# Patient Record
Sex: FEMALE | Race: WHITE | Employment: OTHER | URBAN - METROPOLITAN AREA
[De-identification: names, ages, dates, MRNs, and addresses within clinical notes are randomized per-mention and may not be internally consistent; named-entity substitution may affect disease eponyms.]

---

## 2024-09-03 ENCOUNTER — OFFICE VISIT (OUTPATIENT)
Dept: FAMILY MEDICINE CLINIC | Facility: CLINIC | Age: 89
End: 2024-09-03

## 2024-09-03 ENCOUNTER — HOSPITAL ENCOUNTER (OUTPATIENT)
Dept: GENERAL RADIOLOGY | Age: 89
Discharge: HOME | End: 2024-09-03
Attending: NURSE PRACTITIONER
Payer: MEDICARE

## 2024-09-03 VITALS
HEART RATE: 76 BPM | WEIGHT: 129.13 LBS | SYSTOLIC BLOOD PRESSURE: 117 MMHG | BODY MASS INDEX: 23.76 KG/M2 | HEIGHT: 62 IN | DIASTOLIC BLOOD PRESSURE: 73 MMHG

## 2024-09-03 DIAGNOSIS — M25.562 CHRONIC PAIN OF LEFT KNEE: ICD-10-CM

## 2024-09-03 DIAGNOSIS — G89.29 CHRONIC PAIN OF LEFT KNEE: ICD-10-CM

## 2024-09-03 DIAGNOSIS — Z86.79 HISTORY OF HYPERTENSION: ICD-10-CM

## 2024-09-03 DIAGNOSIS — Z76.89 ENCOUNTER TO ESTABLISH CARE: Primary | ICD-10-CM

## 2024-09-03 DIAGNOSIS — F03.90 DEMENTIA, UNSPECIFIED DEMENTIA SEVERITY, UNSPECIFIED DEMENTIA TYPE, UNSPECIFIED WHETHER BEHAVIORAL, PSYCHOTIC, OR MOOD DISTURBANCE OR ANXIETY (HCC): ICD-10-CM

## 2024-09-03 DIAGNOSIS — Z86.73 HISTORY OF CVA (CEREBROVASCULAR ACCIDENT): ICD-10-CM

## 2024-09-03 PROCEDURE — 73564 X-RAY EXAM KNEE 4 OR MORE: CPT | Performed by: NURSE PRACTITIONER

## 2024-09-03 PROCEDURE — 1159F MED LIST DOCD IN RCRD: CPT | Performed by: NURSE PRACTITIONER

## 2024-09-03 PROCEDURE — 3078F DIAST BP <80 MM HG: CPT | Performed by: NURSE PRACTITIONER

## 2024-09-03 PROCEDURE — 99204 OFFICE O/P NEW MOD 45 MIN: CPT | Performed by: NURSE PRACTITIONER

## 2024-09-03 PROCEDURE — 3008F BODY MASS INDEX DOCD: CPT | Performed by: NURSE PRACTITIONER

## 2024-09-03 PROCEDURE — 3074F SYST BP LT 130 MM HG: CPT | Performed by: NURSE PRACTITIONER

## 2024-09-03 PROCEDURE — 1160F RVW MEDS BY RX/DR IN RCRD: CPT | Performed by: NURSE PRACTITIONER

## 2024-09-03 RX ORDER — MULTIVITAMIN WITH IRON
50 TABLET ORAL DAILY
COMMUNITY

## 2024-09-04 ENCOUNTER — TELEPHONE (OUTPATIENT)
Dept: FAMILY MEDICINE CLINIC | Facility: CLINIC | Age: 89
End: 2024-09-04

## 2024-09-04 DIAGNOSIS — G89.29 CHRONIC PAIN OF LEFT KNEE: ICD-10-CM

## 2024-09-04 DIAGNOSIS — M25.562 CHRONIC PAIN OF LEFT KNEE: ICD-10-CM

## 2024-09-04 DIAGNOSIS — M17.12 ARTHRITIS OF LEFT KNEE: Primary | ICD-10-CM

## 2024-09-04 NOTE — TELEPHONE ENCOUNTER
Advised daughter Arely of Garima Ireland's note and number provided to orthopedic. Daughter verbalized understanding.           Garima Ireland, APRN  9/4/2024 10:36 AM CDT       Please call daughter to inform that x-ray shows arthritis of the knee with bone-on-bone.  A referral for orthopedics has been placed for assessment and treatment options.  Daughter requested a phone call, not MyChart message.  Thank you.

## 2024-09-04 NOTE — TELEPHONE ENCOUNTER
Attempted to call patient's daughter, Arely, regarding her Mom's x-ray results. Left message to call back.

## 2024-10-07 ENCOUNTER — LAB ENCOUNTER (OUTPATIENT)
Dept: LAB | Age: 89
End: 2024-10-07
Attending: FAMILY MEDICINE
Payer: MEDICARE

## 2024-10-07 DIAGNOSIS — F03.90 DEMENTIA, UNSPECIFIED DEMENTIA SEVERITY, UNSPECIFIED DEMENTIA TYPE, UNSPECIFIED WHETHER BEHAVIORAL, PSYCHOTIC, OR MOOD DISTURBANCE OR ANXIETY (HCC): ICD-10-CM

## 2024-10-07 DIAGNOSIS — E55.9 VITAMIN D DEFICIENCY: ICD-10-CM

## 2024-10-07 DIAGNOSIS — Z00.00 ENCOUNTER FOR ANNUAL HEALTH EXAMINATION: ICD-10-CM

## 2024-10-07 PROBLEM — Z86.73 HISTORY OF CVA (CEREBROVASCULAR ACCIDENT): Status: ACTIVE | Noted: 2024-10-07

## 2024-10-07 PROBLEM — Z86.79 HISTORY OF HYPERTENSION: Status: ACTIVE | Noted: 2024-10-07

## 2024-10-07 LAB — VIT D+METAB SERPL-MCNC: 46.8 NG/ML (ref 30–100)

## 2024-10-07 PROCEDURE — 85025 COMPLETE CBC W/AUTO DIFF WBC: CPT | Performed by: FAMILY MEDICINE

## 2024-10-07 PROCEDURE — 82607 VITAMIN B-12: CPT | Performed by: FAMILY MEDICINE

## 2024-10-07 PROCEDURE — 84443 ASSAY THYROID STIM HORMONE: CPT | Performed by: FAMILY MEDICINE

## 2024-10-07 PROCEDURE — 80053 COMPREHEN METABOLIC PANEL: CPT | Performed by: FAMILY MEDICINE

## 2024-10-07 PROCEDURE — 82306 VITAMIN D 25 HYDROXY: CPT

## 2024-10-07 PROCEDURE — 36415 COLL VENOUS BLD VENIPUNCTURE: CPT | Performed by: FAMILY MEDICINE

## 2024-10-07 PROCEDURE — 80061 LIPID PANEL: CPT | Performed by: FAMILY MEDICINE

## 2024-10-17 ENCOUNTER — TELEPHONE (OUTPATIENT)
Dept: FAMILY MEDICINE CLINIC | Facility: CLINIC | Age: 89
End: 2024-10-17

## 2024-10-17 NOTE — TELEPHONE ENCOUNTER
Per Arely, patient says that her Home health is not [facility], they would like to re-certify patient for her Home health care as soon as possible.  Please fax the most recent  office/ progress notes also  [phone].

## 2024-10-28 ENCOUNTER — LAB ENCOUNTER (OUTPATIENT)
Dept: LAB | Age: 89
End: 2024-10-28
Attending: Other
Payer: MEDICARE

## 2024-10-28 DIAGNOSIS — F02.C11 SEVERE LATE ONSET ALZHEIMER'S DEMENTIA WITH AGITATION (HCC): ICD-10-CM

## 2024-10-28 DIAGNOSIS — G30.1 SEVERE LATE ONSET ALZHEIMER'S DEMENTIA WITH AGITATION (HCC): ICD-10-CM

## 2024-10-28 PROBLEM — N18.30 CKD (CHRONIC KIDNEY DISEASE) STAGE 3, GFR 30-59 ML/MIN (HCC): Chronic | Status: ACTIVE | Noted: 2024-10-28

## 2024-10-28 PROCEDURE — 84443 ASSAY THYROID STIM HORMONE: CPT | Performed by: OTHER

## 2024-10-28 PROCEDURE — 83520 IMMUNOASSAY QUANT NOS NONAB: CPT

## 2024-10-28 PROCEDURE — 86780 TREPONEMA PALLIDUM: CPT | Performed by: OTHER

## 2024-10-28 PROCEDURE — 82607 VITAMIN B-12: CPT | Performed by: OTHER

## 2024-10-28 PROCEDURE — 84439 ASSAY OF FREE THYROXINE: CPT | Performed by: OTHER

## 2024-10-28 PROCEDURE — 82746 ASSAY OF FOLIC ACID SERUM: CPT | Performed by: OTHER

## 2024-10-28 PROCEDURE — 36415 COLL VENOUS BLD VENIPUNCTURE: CPT | Performed by: OTHER

## 2024-10-31 LAB
BETA-AMYLOID 40: 164.42 PG/ML
BETA-AMYLOID 40: 164.42 PG/ML
BETA-AMYLOID 42/40 RATIO: 0.11
BETA-AMYLOID 42/40 RATIO: 0.11
BETA-AMYLOID 42: 17.85 PG/ML
BETA-AMYLOID 42: 17.85 PG/ML

## 2024-11-04 ENCOUNTER — TELEPHONE (OUTPATIENT)
Dept: ORTHOPEDICS CLINIC | Facility: CLINIC | Age: 89
End: 2024-11-04

## 2024-11-04 ENCOUNTER — OFFICE VISIT (OUTPATIENT)
Dept: ORTHOPEDICS CLINIC | Facility: CLINIC | Age: 89
End: 2024-11-04
Payer: MEDICARE

## 2024-11-04 VITALS — HEART RATE: 74 BPM | DIASTOLIC BLOOD PRESSURE: 64 MMHG | SYSTOLIC BLOOD PRESSURE: 114 MMHG

## 2024-11-04 DIAGNOSIS — M17.12 PRIMARY OSTEOARTHRITIS OF LEFT KNEE: Primary | ICD-10-CM

## 2024-11-04 RX ORDER — TRIAMCINOLONE ACETONIDE 40 MG/ML
40 INJECTION, SUSPENSION INTRA-ARTICULAR; INTRAMUSCULAR ONCE
Status: COMPLETED | OUTPATIENT
Start: 2024-11-04 | End: 2024-11-04

## 2024-11-04 RX ADMIN — TRIAMCINOLONE ACETONIDE 40 MG: 40 INJECTION, SUSPENSION INTRA-ARTICULAR; INTRAMUSCULAR at 13:20:00

## 2024-11-04 NOTE — TELEPHONE ENCOUNTER
Called patient daughter and informed her per Dr Lr's message as well and again recommended getting evaluated at ER if symptoms continue or do not improve. She verbalized understanding.

## 2024-11-04 NOTE — H&P
NURSING INTAKE COMMENTS:   Chief Complaint   Patient presents with    Knee Pain     Consult left knee pain. Daughter is present at this visit , she will be translating. Per daughter patient complains of pain every time that she stands, onset about 15 to 20 yrs. Hx of falls last fall was on 10/26/24. She fell backward and landed on left side of head, she did not lost consciousness. Has XR in Epic. HX of dementia.       HPI: This 89 year old female presents today with a daughter and a son.  She has dementia.  They know she needs knee replacements long ago.  X-rays show the left knee with end-stage osteoarthritis and alignment with bone-on-bone in all 3 compartments.  Because of her age, the family is not interested in knee replacements.    She lives in a house with stairs with her daughter.  She does not drive.  She leads a sedentary lifestyle does not get out of the house much.  She is a non-smoker.  Uses a walker.  Today in the office she was in a wheelchair.    Past Medical History:    Dementia (HCC)    Essential hypertension    Glaucoma    Stroke (HCC)     History reviewed. No pertinent surgical history.  Current Outpatient Medications   Medication Sig Dispense Refill    donepezil 5 MG Oral Tab Take 1 tablet (5 mg total) by mouth nightly. 90 tablet 3    mirtazapine 7.5 MG Oral Tab Take 1 tablet (7.5 mg total) by mouth nightly. 30 tablet 1    vitamin B-12 50 MCG Oral Tab Take 1 tablet (50 mcg total) by mouth daily.       Allergies[1]  Family History   Problem Relation Age of Onset    Uterine Cancer Sister     Breast Cancer 2nd occurrence Sister     Pancreatic Cancer Other     Other (cirrhosis) Other      No family Hx of DVT/PE    Social History     Occupational History    Not on file   Tobacco Use    Smoking status: Never    Smokeless tobacco: Never   Vaping Use    Vaping status: Never Used   Substance and Sexual Activity    Alcohol use: Never    Drug use: Never    Sexual activity: Not on file        Review of  Systems:  GENERAL: feels generally well, no significant weight loss or weight gain  SKIN: no ulcerated or worrisome skin lesions  EYES:denies blurred vision or double vision  HEENT: denies new nasal congestion, sinus pain or ST  LUNGS: denies shortness of breath  CARDIOVASCULAR: denies chest pain  GI: no hematemesis, no worsening heartburn, no diarrhea  : no dysuria, no blood in urine, no difficulty urinating, no incontinence  MUSCULOSKELETAL: no other musculoskeletal complaints other than in HPI  NEURO: no numbness or tingling, no weakness or balance disorder  PSYCHE: no depression or anxiety  HEMATOLOGIC: no hx of blood dyscrasia, no Hx DVT/PE  ENDOCRINE: no thyroid or diabetes issues  ALL/ASTHMA: no new hx of severe allergy or asthma    Physical Examination:    /64 (BP Location: Left arm, Patient Position: Sitting, Cuff Size: adult)   Pulse 74   Constitutional: appears well hydrated, alert and responsive, no acute distress noted  Extremities: She is relatively slender.  The knees had no asymmetric warmth or effusion.  No pitting edema in the legs.  No calf tenderness.  Musculoskeletal: Motion of the left knee 12 to 95 degrees.  Seems to be tender mostly in the medial joint line although she does not communicate well.  Neurological: Difficult to assess but no evidence of motor deficits.    Imaging: X-rays with bone-on-bone osteoarthritis in all 3 compartments and slight medial subluxation of the femur relative to the tibia.      No results found.     Lab Results   Component Value Date    WBC 7.0 10/07/2024    HGB 12.1 10/07/2024    .0 10/07/2024      Lab Results   Component Value Date    GLU 93 10/07/2024    BUN 17 10/07/2024    CREATSERUM 0.98 10/07/2024        Assessment and Plan:  Diagnoses and all orders for this visit:    Primary osteoarthritis of left knee  -     Arthrocentesis aspiration and injection major Left joint bursa w/o US  -     triamcinolone acetonide (Kenalog-40) 40 MG/ML  injection 40 mg        Assessment: End-stage osteoarthritis in a person with dementia.  Family not interested in knee replacements.    Plan: They desired cortisone injection.  Aspiration was negative and she tolerated the injection bupivacaine 0.5% 5 cc and Kenalog 1 cc without issue.    We talked about hyaluronic acid injections as well.  For now I will see her as needed.    Follow Up: No follow-ups on file.    Home Lr MD         [1] No Known Allergies

## 2024-11-04 NOTE — TELEPHONE ENCOUNTER
I agree this is not a typical cortisone shot reaction.  Maybe have them check her glucose levels.  She is not diabetic but may be her glucose levels elevated.  That typically does not happen for a day.  The patient has dementia so maybe she is just confused about the whole process and that is making her ill as well.  If her symptoms persist and she is dehydrated, she will need to go to urgent care or ER.

## 2024-11-04 NOTE — PROGRESS NOTES
Per verbal order from Dr. Lr, draw up 5ml of 0.5% Marcaine and 1ml of Kenalog 40 for cortisone injection to left knee Isaiah ROCA MA  Patient provided education handout for cortisone injection.

## 2024-11-04 NOTE — TELEPHONE ENCOUNTER
Spoke with patient daughter and she states when patient came home from her office visit today she was feeling weak and vomited once about 1 hour ago. She hasn't vomited since. She denies any chest pain, shortness of breath, cough, facial swelling, throat tightness, rash, abdominal pain, diarrhea. She denies patient being diabetic, taking any blood pressure medication. She states sometimes patient gets a little car sick after riding in the car. She states patient was fatigued all day on 11/3 and is still fatigued today. She states she just got over bronchitis but patient hasn't been around anyone else ill lately. She has been providing patient water for the past hour and patient is holding it down. I did advise this is not a typical reaction to cortisone injection. I recommended if vomiting persists or any other symptoms arise she should be evaluated at ER.  She verbalized understanding.   Also provided her phone number to [employer] customer service as she is unable to log in to patient [employer].

## 2024-12-11 ENCOUNTER — TELEPHONE (OUTPATIENT)
Dept: FAMILY MEDICINE CLINIC | Facility: CLINIC | Age: 89
End: 2024-12-11

## 2024-12-11 RX ORDER — MIRTAZAPINE 7.5 MG/1
7.5 TABLET, FILM COATED ORAL NIGHTLY
Qty: 30 TABLET | Refills: 1 | Status: SHIPPED | OUTPATIENT
Start: 2024-12-11

## 2024-12-11 NOTE — TELEPHONE ENCOUNTER
Daughter reports pt is not sleeping. Sending in mirtazapine.   
Oriented - self; Oriented - place; Oriented - time

## 2025-02-02 ENCOUNTER — APPOINTMENT (OUTPATIENT)
Dept: GENERAL RADIOLOGY | Facility: HOSPITAL | Age: 89
End: 2025-02-02
Attending: EMERGENCY MEDICINE
Payer: MEDICARE

## 2025-02-02 ENCOUNTER — HOSPITAL ENCOUNTER (EMERGENCY)
Facility: HOSPITAL | Age: 89
Discharge: HOME | End: 2025-02-02
Attending: EMERGENCY MEDICINE
Payer: MEDICARE

## 2025-02-02 VITALS
RESPIRATION RATE: 126 BRPM | OXYGEN SATURATION: 97 % | DIASTOLIC BLOOD PRESSURE: 76 MMHG | SYSTOLIC BLOOD PRESSURE: 130 MMHG | TEMPERATURE: 98 F | HEART RATE: 85 BPM

## 2025-02-02 DIAGNOSIS — S70.00XA CONTUSION OF HIP, UNSPECIFIED LATERALITY, INITIAL ENCOUNTER: ICD-10-CM
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
## 2025-02-02 LAB
ALBUMIN SERPL-MCNC: 4.1 G/DL (ref 3.2–4.8)
ALP LIVER SERPL-CCNC: 64 U/L
ALT SERPL-CCNC: <7 U/L
ANION GAP SERPL CALC-SCNC: 8 MMOL/L (ref 0–18)
AST SERPL-CCNC: 16 U/L (ref ?–34)
BASOPHILS # BLD AUTO: 0.06 X10(3) UL (ref 0–0.2)
BASOPHILS NFR BLD AUTO: 0.7 %
BILIRUB DIRECT SERPL-MCNC: 0.1 MG/DL (ref ?–0.3)
BILIRUB SERPL-MCNC: 0.3 MG/DL (ref 0.2–1.1)
BUN BLD-MCNC: 10 MG/DL (ref 9–23)
BUN/CREAT SERPL: 10.3 (ref 10–20)
CALCIUM BLD-MCNC: 9.9 MG/DL (ref 8.7–10.4)
CHLORIDE SERPL-SCNC: 103 MMOL/L (ref 98–112)
CO2 SERPL-SCNC: 28 MMOL/L (ref 21–32)
CREAT BLD-MCNC: 0.97 MG/DL
DEPRECATED RDW RBC AUTO: 47.9 FL (ref 35.1–46.3)
EGFRCR SERPLBLD CKD-EPI 2021: 57 ML/MIN/1.73M2 (ref 60–?)
EOSINOPHIL # BLD AUTO: 0.08 X10(3) UL (ref 0–0.7)
EOSINOPHIL NFR BLD AUTO: 0.9 %
ERYTHROCYTE [DISTWIDTH] IN BLOOD BY AUTOMATED COUNT: 13 % (ref 11–15)
GLUCOSE BLD-MCNC: 93 MG/DL (ref 70–99)
HCT VFR BLD AUTO: 37.2 %
HGB BLD-MCNC: 12.1 G/DL
IMM GRANULOCYTES # BLD AUTO: 0.02 X10(3) UL (ref 0–1)
IMM GRANULOCYTES NFR BLD: 0.2 %
LYMPHOCYTES # BLD AUTO: 1.42 X10(3) UL (ref 1–4)
LYMPHOCYTES NFR BLD AUTO: 16.8 %
MCH RBC QN AUTO: 32.5 PG (ref 26–34)
MCHC RBC AUTO-ENTMCNC: 32.5 G/DL (ref 31–37)
MCV RBC AUTO: 100 FL
MONOCYTES # BLD AUTO: 0.52 X10(3) UL (ref 0.1–1)
MONOCYTES NFR BLD AUTO: 6.2 %
NEUTROPHILS # BLD AUTO: 6.35 X10 (3) UL (ref 1.5–7.7)
NEUTROPHILS # BLD AUTO: 6.35 X10(3) UL (ref 1.5–7.7)
NEUTROPHILS NFR BLD AUTO: 75.2 %
OSMOLALITY SERPL CALC.SUM OF ELEC: 287 MOSM/KG (ref 275–295)
PLATELET # BLD AUTO: 267 10(3)UL (ref 150–450)
POTASSIUM SERPL-SCNC: 3.8 MMOL/L (ref 3.5–5.1)
PROT SERPL-MCNC: 7 G/DL (ref 5.7–8.2)
RBC # BLD AUTO: 3.72 X10(6)UL
SODIUM SERPL-SCNC: 139 MMOL/L (ref 136–145)
WBC # BLD AUTO: 8.5 X10(3) UL (ref 4–11)

## 2025-02-02 PROCEDURE — 99284 EMERGENCY DEPT VISIT MOD MDM: CPT

## 2025-02-02 PROCEDURE — 73502 X-RAY EXAM HIP UNI 2-3 VIEWS: CPT | Performed by: EMERGENCY MEDICINE

## 2025-02-02 PROCEDURE — 80076 HEPATIC FUNCTION PANEL: CPT | Performed by: EMERGENCY MEDICINE

## 2025-02-02 PROCEDURE — 85025 COMPLETE CBC W/AUTO DIFF WBC: CPT | Performed by: EMERGENCY MEDICINE

## 2025-02-02 PROCEDURE — 80048 BASIC METABOLIC PNL TOTAL CA: CPT | Performed by: EMERGENCY MEDICINE

## 2025-02-02 PROCEDURE — 99283 EMERGENCY DEPT VISIT LOW MDM: CPT

## 2025-02-02 PROCEDURE — 36415 COLL VENOUS BLD VENIPUNCTURE: CPT

## 2025-02-02 NOTE — ED PROVIDER NOTES
Patient Seen in: [facility] Emergency Department    History     Chief Complaint   Patient presents with    Fall       HPI    89-year-old female with past medical history significant for dementia, stroke, high blood pressure, presents to the ED for evaluation of fall.  Daughter is at bedside and states that patient was transferring from bedside commode to the bed.  She tried sitting back on the bed and was too far away.  She fell onto her buttocks on the ground.  She did not hit her head or have LOC.  She is not on blood thinners.  Daughter states that patient appears to have some discomfort in both of her hips currently.  She states that patient seems weaker than usual in the last few days.    History from Independent Source: Mother gave initial history as stated in [facility].    External Records Reviewed: Patient was seen by orthopedics in November and was diagnosed with osteoarthritis left knee.    History reviewed.   Past Medical History:    Dementia (HCC)    Essential hypertension    Glaucoma    Stroke (HCC)       History reviewed. History reviewed. No pertinent surgical history.      Medications :  Prescriptions Prior to Admission[1]     Family History   Problem Relation Age of Onset    Uterine Cancer Sister     Breast Cancer 2nd occurrence Sister     Pancreatic Cancer Other     Other (cirrhosis) Other        Smoking Status:   Social History     Socioeconomic History    Marital status:    Tobacco Use    Smoking status: Never    Smokeless tobacco: Never   Vaping Use    Vaping status: Never Used   Substance and Sexual Activity    Alcohol use: Never    Drug use: Never       Constitutional and vital signs reviewed.      Social History and Family History elements reviewed from today, pertinent positives to the presenting problem noted.    Physical Exam     ED Triage Vitals   BP 02/02/25 1108 113/62   Pulse 02/02/25 1108 77   Resp 02/02/25 1108 18   Temp 02/02/25 1108 97.8 °F (36.6 °C)   Temp src --    SpO2  02/02/25 1108 98 %   O2 Device 02/02/25 1208 None (Room air)       Physical Exam   Constitutional: AAOx1, well nourished, NAD  HEENT: Normocephalic, PERRLA, MMM  CV: s1s2+, RRR, no m/r/g, normal distal pulses  Pulmonary/Chest: CTA b/l with no rales, wheezes.  No chest wall tenderness  Abdominal: Nontender.  Nondistended. Soft. Bowel sounds are normal.   Neck/Back: Nontender  :   Musculoskeletal: Normal range of motion. No deformity.   Neurological: Awake, alert. Normal reflexes. No cranial nerve deficit.    Skin: Skin is warm and dry. No rash noted. No erythema.   Psychiatric:      All measures to prevent infection transmission during my interaction with the patient were taken. The patient was already wearing a droplet mask on my arrival to the room. Personal protective equipment was worn throughout the duration of the exam.      ED Course        Labs Reviewed   BASIC METABOLIC PANEL (8) - Abnormal; Notable for the following components:       Result Value    eGFR-Cr 57 (*)     All other components within normal limits   CBC WITH DIFFERENTIAL WITH PLATELET - Abnormal; Notable for the following components:    RBC 3.72 (*)     RDW-SD 47.9 (*)     All other components within normal limits   HEPATIC FUNCTION PANEL (7) - Abnormal; Notable for the following components:    ALT <7 (*)     All other components within normal limits   URINALYSIS WITH CULTURE REFLEX     My Independent Interpretation of EKG (if performed):       Imaging Results Available and Reviewed while in ED: No results found.  ED Medications Administered: Medications - No data to display          MDM     Vitals:    02/02/25 1108 02/02/25 1208 02/02/25 1332   BP: 113/62 136/68 132/68   Pulse: 77 66 67   Resp: 18 16 16   Temp: 97.8 °F (36.6 °C)     SpO2: 98% 97% 96%     *I personally reviewed and interpreted all ED vitals.    Independent Interpretation of Studies:     Social Determinants of Health:     Procedures:      Differential/MDM/Shared Decision  Making: Differential Diagnosis includes fracture, dislocation, dehydration, electrolyte abnormality, infection, others.      The patient already  has a past medical history of Dementia ([facility]), Essential hypertension, Glaucoma, and Stroke ([facility]) (2015).  to contribute to the complexity of this ED evaluation.           Medications, Diagnostics, or Disposition considered but not done:      Management of case was discussed with oncoming shift to follow-up results of radiology results for final disposition.      Condition upon leaving the department: Stable    Disposition and Plan     Clinical Impression:  1. Fall, initial encounter    2. Contusion of hip, unspecified laterality, initial encounter        Disposition:  There is no disposition on file for this visit.    Follow-up:  Radha Arce MD  [address] [place] [id number]  [phone]    Call in 2 day(s)        Medications Prescribed:  Current Discharge Medication List                   [1] (Not in a hospital admission)

## 2025-02-02 NOTE — ED QUICK NOTES
Patient safe to discharge home per ED Provider. Discharge education provided, including follow up instructions. IV removed by this RN. Pt and daughter verbalized understanding.

## 2025-02-02 NOTE — ED INITIAL ASSESSMENT (HPI)
Pt presents to ED with daughter for mechanical fall. Per daughter  pt was trying to sit at the edge of the bed when she missed the bed and fell. Per daughter pt did not hit her head. Pt c/o of L knee pain, L back pain and hip pain. Pt A&ox1 hx dementia

## 2025-02-10 ENCOUNTER — TELEPHONE (OUTPATIENT)
Dept: FAMILY MEDICINE CLINIC | Facility: CLINIC | Age: 89
End: 2025-02-10

## 2025-02-10 NOTE — TELEPHONE ENCOUNTER
Patient's daughter Arely called (on Release of Information), verified patient's Name and . States patient is supposed to be seen today for followup, following ED visit on . However, patient is not cooperating and daughter had to cancel the appointment. Requesting to reschedule patient's appointment.     Best callback, daughter Arely [phone]. RN okay to leave voicemail to confirm appointment if she does not answer. Thank you.     Dr. Arce please advise is okay to use Res 24 on  at 2:15.

## 2025-02-11 NOTE — TELEPHONE ENCOUNTER
Left message to call back regarding 30 min appointment. Tentatively schedule appointment for Thursday 2/13 at 8:30am.

## 2025-02-11 NOTE — TELEPHONE ENCOUNTER
Patient daughter says they can't do that time because it is too early for her. Also the weather tomorrow might not be good. Would like a call back

## 2025-03-26 ENCOUNTER — APPOINTMENT (OUTPATIENT)
Dept: GENERAL RADIOLOGY | Facility: HOSPITAL | Age: 89
End: 2025-03-26
Payer: MEDICARE

## 2025-03-26 ENCOUNTER — HOSPITAL ENCOUNTER (EMERGENCY)
Facility: HOSPITAL | Age: 89
Discharge: HOME | End: 2025-03-26
Attending: STUDENT IN AN ORGANIZED HEALTH CARE EDUCATION/TRAINING PROGRAM
Payer: MEDICARE

## 2025-03-26 VITALS
BODY MASS INDEX: 17 KG/M2 | DIASTOLIC BLOOD PRESSURE: 58 MMHG | OXYGEN SATURATION: 97 % | TEMPERATURE: 98 F | HEART RATE: 73 BPM | SYSTOLIC BLOOD PRESSURE: 143 MMHG | WEIGHT: 95 LBS | RESPIRATION RATE: 19 BRPM

## 2025-03-26 DIAGNOSIS — R07.89 CHEST PAIN, ATYPICAL: Primary | ICD-10-CM

## 2025-03-26 LAB
ALBUMIN SERPL-MCNC: 3.8 G/DL (ref 3.2–4.8)
ALBUMIN/GLOB SERPL: 1.4 {RATIO} (ref 1–2)
ALP LIVER SERPL-CCNC: 62 U/L
ALT SERPL-CCNC: <7 U/L
ANION GAP SERPL CALC-SCNC: 7 MMOL/L (ref 0–18)
AST SERPL-CCNC: 18 U/L (ref ?–34)
BASOPHILS # BLD AUTO: 0.06 X10(3) UL (ref 0–0.2)
BASOPHILS NFR BLD AUTO: 0.8 %
BILIRUB SERPL-MCNC: 0.2 MG/DL (ref 0.2–1.1)
BUN BLD-MCNC: 12 MG/DL (ref 9–23)
BUN/CREAT SERPL: 13.2 (ref 10–20)
CALCIUM BLD-MCNC: 9.3 MG/DL (ref 8.7–10.4)
CHLORIDE SERPL-SCNC: 102 MMOL/L (ref 98–112)
CO2 SERPL-SCNC: 29 MMOL/L (ref 21–32)
CREAT BLD-MCNC: 0.91 MG/DL
DEPRECATED RDW RBC AUTO: 49.7 FL (ref 35.1–46.3)
EGFRCR SERPLBLD CKD-EPI 2021: 61 ML/MIN/1.73M2 (ref 60–?)
EOSINOPHIL # BLD AUTO: 0.11 X10(3) UL (ref 0–0.7)
EOSINOPHIL NFR BLD AUTO: 1.5 %
ERYTHROCYTE [DISTWIDTH] IN BLOOD BY AUTOMATED COUNT: 13.4 % (ref 11–15)
GLOBULIN PLAS-MCNC: 2.7 G/DL (ref 2–3.5)
GLUCOSE BLD-MCNC: 124 MG/DL (ref 70–99)
HCT VFR BLD AUTO: 33.2 %
HGB BLD-MCNC: 11.1 G/DL
IMM GRANULOCYTES # BLD AUTO: 0.02 X10(3) UL (ref 0–1)
IMM GRANULOCYTES NFR BLD: 0.3 %
LYMPHOCYTES # BLD AUTO: 1.08 X10(3) UL (ref 1–4)
LYMPHOCYTES NFR BLD AUTO: 14.7 %
MCH RBC QN AUTO: 33.4 PG (ref 26–34)
MCHC RBC AUTO-ENTMCNC: 33.4 G/DL (ref 31–37)
MCV RBC AUTO: 100 FL
MONOCYTES # BLD AUTO: 0.78 X10(3) UL (ref 0.1–1)
MONOCYTES NFR BLD AUTO: 10.6 %
NEUTROPHILS # BLD AUTO: 5.32 X10 (3) UL (ref 1.5–7.7)
NEUTROPHILS # BLD AUTO: 5.32 X10(3) UL (ref 1.5–7.7)
NEUTROPHILS NFR BLD AUTO: 72.1 %
OSMOLALITY SERPL CALC.SUM OF ELEC: 287 MOSM/KG (ref 275–295)
PLATELET # BLD AUTO: 271 10(3)UL (ref 150–450)
POTASSIUM SERPL-SCNC: 4.2 MMOL/L (ref 3.5–5.1)
PROT SERPL-MCNC: 6.5 G/DL (ref 5.7–8.2)
RBC # BLD AUTO: 3.32 X10(6)UL
SODIUM SERPL-SCNC: 138 MMOL/L (ref 136–145)
TROPONIN I SERPL HS-MCNC: 7 NG/L
TROPONIN I SERPL HS-MCNC: 9 NG/L
WBC # BLD AUTO: 7.4 X10(3) UL (ref 4–11)

## 2025-03-26 PROCEDURE — 99285 EMERGENCY DEPT VISIT HI MDM: CPT

## 2025-03-26 PROCEDURE — 80053 COMPREHEN METABOLIC PANEL: CPT | Performed by: STUDENT IN AN ORGANIZED HEALTH CARE EDUCATION/TRAINING PROGRAM

## 2025-03-26 PROCEDURE — 93010 ELECTROCARDIOGRAM REPORT: CPT

## 2025-03-26 PROCEDURE — 36415 COLL VENOUS BLD VENIPUNCTURE: CPT

## 2025-03-26 PROCEDURE — 71045 X-RAY EXAM CHEST 1 VIEW: CPT | Performed by: STUDENT IN AN ORGANIZED HEALTH CARE EDUCATION/TRAINING PROGRAM

## 2025-03-26 PROCEDURE — 93005 ELECTROCARDIOGRAM TRACING: CPT

## 2025-03-26 PROCEDURE — 84484 ASSAY OF TROPONIN QUANT: CPT | Performed by: STUDENT IN AN ORGANIZED HEALTH CARE EDUCATION/TRAINING PROGRAM

## 2025-03-26 PROCEDURE — 85025 COMPLETE CBC W/AUTO DIFF WBC: CPT | Performed by: STUDENT IN AN ORGANIZED HEALTH CARE EDUCATION/TRAINING PROGRAM

## 2025-03-26 NOTE — ED PROVIDER NOTES
Patient Seen in: [facility] Emergency Department      History     Chief Complaint   Patient presents with    Chest Pain     Stated Complaint: Chest pain,left arm tingling    Subjective:   HPI      89-year-old female history of dementia hypertension CVA, presenting with chest pain left arm tingling.  Onset symptoms 2 PM today. Pain was constant for 2-3 hours then resolved. No difficulty breathing, no associated cough, no LE swelling.     Objective:     Past Medical History:    Dementia (HCC)    Essential hypertension    Glaucoma    Stroke (HCC)              History reviewed. No pertinent surgical history.             Social History     Socioeconomic History    Marital status:    Tobacco Use    Smoking status: Never    Smokeless tobacco: Never   Vaping Use    Vaping status: Never Used   Substance and Sexual Activity    Alcohol use: Never    Drug use: Never                  Physical Exam     ED Triage Vitals [03/26/25 1751]   /57   Pulse 84   Resp 20   Temp 98.1 °F (36.7 °C)   Temp src Oral   SpO2 99 %   O2 Device None (Room air)       Current Vitals:   Vital Signs  BP: 143/58  Pulse: 73  Resp: 19  Temp: 98.1 °F (36.7 °C)  Temp src: Oral  MAP (mmHg): 79    Oxygen Therapy  SpO2: 97 %  O2 Device: None (Room air)        Physical Exam  Constitutional: awake, alert, no sig distress  HENT: mmm, no lesions,  Neck: normal range of motion, no tenderness, supple.  Eyes: PERRL, EOMI, conjunctiva normal, no discharge. Sclera anicteric.  Cardiovascular: rr no murmur  Respiratory: Normal breath sounds, no respiratory distress, no wheezing, +TTP left chest wall  GI: Bowel sounds normal, Soft, no tenderness, no masses, no pulsatile masses.  : No CVA tenderness.  Skin: Warm, dry, no erythema, no rash.  Musculoskeletal: Intact distal pulses, no edema, no tenderness, no cyanosis, no clubbing. Good range of motion in all major joints. No tenderness to palpation or major deformities noted. Back- No  tenderness.  Neurologic: Alert & oriented x 3, normal motor function, normal sensory function, no focal deficits noted.  Psych: Calm, cooperative, nl affect        ED Course     Labs Reviewed   CBC WITH DIFFERENTIAL WITH PLATELET - Abnormal; Notable for the following components:       Result Value    RBC 3.32 (*)     HGB 11.1 (*)     HCT 33.2 (*)     RDW-SD 49.7 (*)     All other components within normal limits   COMP METABOLIC PANEL (14) - Abnormal; Notable for the following components:    Glucose 124 (*)     ALT <7 (*)     All other components within normal limits   TROPONIN I HIGH SENSITIVITY - Normal   TROPONIN I HIGH SENSITIVITY - Normal   RAINBOW DRAW LAVENDER   RAINBOW DRAW LIGHT GREEN   RAINBOW DRAW BLUE              ED Course as of 03/27/25 0104  ------------------------------------------------------------  Time: 03/26 1822  Comment: EKG as interpreted by ED Physician: NSR 89 BPM nonspecific T wave changes, no st change, no stemi Qtc 440ms  -no previous available for comparison              MDM      89F hx as above presenting with chest pain  On arrival vss, reassuring  Ddx: ACS, MSK Pain, GERD  -low clinical suspicion for PE or acute aortic syndrome    Plan: Labs troponin x 2 chest x-ray    I have independently reviewed patient's chest x-ray do not appreciate any acute cardiopulmonary findings.  -labs reviewed, no acute findings, troponin x2 negative - history not suggestive of cardiac etiology, reproducible on exam. No exertional component. Plan for outpt f/u.   Discussed return precautions and follow up instructions with patient who voiced understanding and agreement with the plan. All questions answered.     Medical Decision Making      Disposition and Plan     Clinical Impression:  1. Chest pain, atypical         Disposition:  Discharge  3/26/2025 10:07 pm    Follow-up:  Radha Arce MD  [address] [place] [id number]  [phone]    Follow up      [facility] Emergency  Department  [address] [place] [address]  [phone]  Follow up  As needed, If symptoms worsen    We recommend that you schedule follow up care with a primary care provider within the next three months to obtain basic health screening including reassessment of your blood pressure.      Medications Prescribed:  Discharge Medication List as of 3/26/2025 10:08 PM              Supplementary Documentation:

## 2025-03-26 NOTE — ED INITIAL ASSESSMENT (HPI)
Patient presents with chest pain left sided since 2pm.  Denies shortness of breath, numbness  Pain on breast when touched.    2 baby aspirin at 2pm

## 2025-03-27 LAB
ATRIAL RATE: 89 BPM
P AXIS: 74 DEGREES
P-R INTERVAL: 118 MS
Q-T INTERVAL: 362 MS
QRS DURATION: 72 MS
QTC CALCULATION (BEZET): 440 MS
R AXIS: 70 DEGREES
T AXIS: 65 DEGREES
VENTRICULAR RATE: 89 BPM

## 2025-04-01 ENCOUNTER — PATIENT OUTREACH (OUTPATIENT)
Dept: CASE MANAGEMENT | Age: 89
End: 2025-04-01

## 2025-04-01 NOTE — PROGRESS NOTES
ED Hospital Follow up for PCP (Discharge 3/26 elm)     PCP   Radha Arce  [facility]  [address]  [place] [id number] [phone]    Pt's insurance is no longer INN      Confirmed with pt's daughter   Closing encounter

## 2025-04-02 ENCOUNTER — APPOINTMENT (OUTPATIENT)
Dept: CT IMAGING | Facility: HOSPITAL | Age: 89
End: 2025-04-02
Attending: EMERGENCY MEDICINE
Payer: MEDICARE

## 2025-04-02 ENCOUNTER — APPOINTMENT (OUTPATIENT)
Dept: GENERAL RADIOLOGY | Facility: HOSPITAL | Age: 89
End: 2025-04-02
Attending: EMERGENCY MEDICINE
Payer: MEDICARE

## 2025-04-02 ENCOUNTER — HOSPITAL ENCOUNTER (INPATIENT)
Facility: HOSPITAL | Age: 89
LOS: 2 days | Discharge: HOME HEALTH | End: 2025-04-04
Attending: EMERGENCY MEDICINE | Admitting: INTERNAL MEDICINE
Payer: MEDICARE

## 2025-04-02 ENCOUNTER — HOSPITAL ENCOUNTER (INPATIENT)
Facility: HOSPITAL | Age: 89
LOS: 2 days | Discharge: HOME | End: 2025-04-04
Attending: EMERGENCY MEDICINE | Admitting: INTERNAL MEDICINE
Payer: MEDICARE

## 2025-04-02 DIAGNOSIS — S06.5XAA ACUTE SUBDURAL HEMATOMA (HCC): Primary | ICD-10-CM

## 2025-04-02 DIAGNOSIS — I62.03 CHRONIC SUBDURAL HEMATOMA (HCC): ICD-10-CM

## 2025-04-02 LAB
ANION GAP SERPL CALC-SCNC: 8 MMOL/L (ref 0–18)
BASOPHILS # BLD AUTO: 0.04 X10(3) UL (ref 0–0.2)
BASOPHILS NFR BLD AUTO: 0.3 %
BILIRUB UR QL: NEGATIVE
BUN BLD-MCNC: 14 MG/DL (ref 9–23)
BUN/CREAT SERPL: 13.9 (ref 10–20)
CALCIUM BLD-MCNC: 9.6 MG/DL (ref 8.7–10.4)
CHLORIDE SERPL-SCNC: 100 MMOL/L (ref 98–112)
CLARITY UR: CLEAR
CO2 SERPL-SCNC: 29 MMOL/L (ref 21–32)
COLOR UR: YELLOW
CREAT BLD-MCNC: 1.01 MG/DL
DEPRECATED RDW RBC AUTO: 47.1 FL (ref 35.1–46.3)
EGFRCR SERPLBLD CKD-EPI 2021: 54 ML/MIN/1.73M2 (ref 60–?)
EOSINOPHIL # BLD AUTO: 0.03 X10(3) UL (ref 0–0.7)
EOSINOPHIL NFR BLD AUTO: 0.2 %
ERYTHROCYTE [DISTWIDTH] IN BLOOD BY AUTOMATED COUNT: 13.1 % (ref 11–15)
GLUCOSE BLD-MCNC: 114 MG/DL (ref 70–99)
GLUCOSE UR-MCNC: 30 MG/DL
HCT VFR BLD AUTO: 34.4 %
HGB BLD-MCNC: 11.7 G/DL
HYALINE CASTS #/AREA URNS AUTO: PRESENT /LPF
IMM GRANULOCYTES # BLD AUTO: 0.04 X10(3) UL (ref 0–1)
IMM GRANULOCYTES NFR BLD: 0.3 %
LEUKOCYTE ESTERASE UR QL STRIP.AUTO: 500
LYMPHOCYTES # BLD AUTO: 0.55 X10(3) UL (ref 1–4)
LYMPHOCYTES NFR BLD AUTO: 4.2 %
MCH RBC QN AUTO: 33.7 PG (ref 26–34)
MCHC RBC AUTO-ENTMCNC: 34 G/DL (ref 31–37)
MCV RBC AUTO: 99.1 FL
MONOCYTES # BLD AUTO: 0.81 X10(3) UL (ref 0.1–1)
MONOCYTES NFR BLD AUTO: 6.1 %
NEUTROPHILS # BLD AUTO: 11.73 X10 (3) UL (ref 1.5–7.7)
NEUTROPHILS # BLD AUTO: 11.73 X10(3) UL (ref 1.5–7.7)
NEUTROPHILS NFR BLD AUTO: 88.9 %
NITRITE UR QL STRIP.AUTO: NEGATIVE
OSMOLALITY SERPL CALC.SUM OF ELEC: 285 MOSM/KG (ref 275–295)
PH UR: 6 [PH] (ref 5–8)
PLATELET # BLD AUTO: 285 10(3)UL (ref 150–450)
POTASSIUM SERPL-SCNC: 3.4 MMOL/L (ref 3.5–5.1)
RBC # BLD AUTO: 3.47 X10(6)UL
SODIUM SERPL-SCNC: 137 MMOL/L (ref 136–145)
SP GR UR STRIP: 1.02 (ref 1–1.03)
TROPONIN I SERPL HS-MCNC: 11 NG/L
UROBILINOGEN UR STRIP-ACNC: NORMAL
WBC # BLD AUTO: 13.2 X10(3) UL (ref 4–11)

## 2025-04-02 PROCEDURE — 70450 CT HEAD/BRAIN W/O DYE: CPT | Performed by: EMERGENCY MEDICINE

## 2025-04-02 PROCEDURE — 72131 CT LUMBAR SPINE W/O DYE: CPT | Performed by: EMERGENCY MEDICINE

## 2025-04-02 PROCEDURE — 73060 X-RAY EXAM OF HUMERUS: CPT | Performed by: EMERGENCY MEDICINE

## 2025-04-02 PROCEDURE — 72125 CT NECK SPINE W/O DYE: CPT | Performed by: EMERGENCY MEDICINE

## 2025-04-02 PROCEDURE — 99223 1ST HOSP IP/OBS HIGH 75: CPT | Performed by: STUDENT IN AN ORGANIZED HEALTH CARE EDUCATION/TRAINING PROGRAM

## 2025-04-02 RX ORDER — LATANOPROST 50 UG/ML
1 SOLUTION/ DROPS OPHTHALMIC NIGHTLY
COMMUNITY
Start: 2025-02-19

## 2025-04-02 RX ORDER — ASPIRIN 81 MG/1
81 TABLET ORAL DAILY
COMMUNITY
End: 2025-04-02 | Stop reason: CLARIF

## 2025-04-02 RX ORDER — METOPROLOL TARTRATE 25 MG/1
25 TABLET, FILM COATED ORAL ONCE
Status: COMPLETED | OUTPATIENT
Start: 2025-04-02 | End: 2025-04-02

## 2025-04-02 RX ORDER — MORPHINE SULFATE 2 MG/ML
2 INJECTION, SOLUTION INTRAMUSCULAR; INTRAVENOUS ONCE
Status: COMPLETED | OUTPATIENT
Start: 2025-04-02 | End: 2025-04-02

## 2025-04-02 RX ORDER — SODIUM CHLORIDE 9 MG/ML
INJECTION, SOLUTION INTRAVENOUS CONTINUOUS
Status: DISCONTINUED | OUTPATIENT
Start: 2025-04-02 | End: 2025-04-02

## 2025-04-02 RX ORDER — METOCLOPRAMIDE HYDROCHLORIDE 5 MG/ML
5 INJECTION INTRAMUSCULAR; INTRAVENOUS EVERY 8 HOURS PRN
Status: DISCONTINUED | OUTPATIENT
Start: 2025-04-02 | End: 2025-04-02

## 2025-04-02 RX ORDER — ONDANSETRON 2 MG/ML
4 INJECTION INTRAMUSCULAR; INTRAVENOUS EVERY 6 HOURS PRN
Status: DISCONTINUED | OUTPATIENT
Start: 2025-04-02 | End: 2025-04-04

## 2025-04-02 RX ORDER — ACETAMINOPHEN 500 MG
500 TABLET ORAL EVERY 4 HOURS PRN
Status: DISCONTINUED | OUTPATIENT
Start: 2025-04-02 | End: 2025-04-04

## 2025-04-02 RX ORDER — QUETIAPINE FUMARATE 25 MG/1
25 TABLET, FILM COATED ORAL NIGHTLY PRN
Status: DISCONTINUED | OUTPATIENT
Start: 2025-04-02 | End: 2025-04-04

## 2025-04-02 RX ORDER — ACETAMINOPHEN/DIPHENHYDRAMINE 500MG-25MG
1-2 TABLET ORAL NIGHTLY PRN
COMMUNITY
End: 2025-04-02 | Stop reason: CLARIF

## 2025-04-02 NOTE — H&P
[facility] and [facility] Hospitalist H&P       CC: Fall     PCP: WENDI SUE MD    History of Present Illness:   This is a 89-year-old female with a history of dementia, history of stroke in the past, hypertension, who presents to the hospital for falls, weakness, left arm pain.  History obtained by daughter and son at bedside.  The patient apparently was seen from their camera at the edge of the bed, and slowly slid off the bed around 4 AM today.  She was having some left arm pain the last few days so they were giving her some aspirin daily.  There is no witness fall to her head however.  At baseline the patient is mainly wheelchair-bound.  She does get up sometimes and transfers to a walker.  The only time she usually does this herself is through get onto the commode.  Otherwise she requires assistance.  She is normally sitting most of the day.  In terms of food intake, she eats very soft foods or liquids.  No recent aspiration events.  She does urinate in the commode as well as bowel movements.  In terms of her speech, she is still able to communicate but is forgetful.  The patient is not currently taking any medications.    Review of Systems  Comprehensive ROS reviewed and negative except for what's stated above.     PMH  Past Medical History:    Dementia (HCC)    Essential hypertension    Glaucoma    Stroke (HCC)      PSH  No past surgical history on file.     ALL:  Allergies[1]     Home Medications:  She takes no chronic medication    Soc Hx  Social History     Tobacco Use    Smoking status: Never    Smokeless tobacco: Never   Substance Use Topics    Alcohol use: Never      Fam Hx  Family History   Problem Relation Age of Onset    Uterine Cancer Sister     Breast Cancer 2nd occurrence Sister     Pancreatic Cancer Other     Other (cirrhosis) Other      OBJECTIVE:  /71   Pulse 90   Temp 98.2 °F (36.8 °C) (Oral)   Resp 18   SpO2 98%   General: Sleeping, but easily awakened and does speak to me, only  speaks in [place]  Heart: Regular rate and rhythm  Abdomen: soft, non tender  Extremities: No edema  Neuro: Pupils equal and reactive, facial features are symmetric, speech is not dysarthric, he is able to squeeze both my hands bilaterally.  She does spontaneously move her legs but does not really follow commands when I asked her to lift them.  Muscle tone is normal.  No rigidity.  Not assessed her gait    Diagnostic Data:    CBC/Chem  Recent Labs   Lab 03/26/25 1844 04/02/25  1111   WBC 7.4 13.2*   HGB 11.1* 11.7*   .0 99.1   .0 285.0       Recent Labs   Lab 03/26/25  1844 04/02/25  1111    137   K 4.2 3.4*    100   CO2 29.0 29.0   BUN 12 14   CREATSERUM 0.91 1.01   * 114*   CA 9.3 9.6       Recent Labs   Lab 03/26/25 1844   ALT <7*   AST 18   ALB 3.8       No results for input(s): \"TROP\" in the last 168 hours.      Radiology: CT SPINE LUMBAR (CPT=72131)    Result Date: 4/2/2025  CONCLUSION:   1. Small cortical irregularity involving the right transverse process of L2, which is concerning for an age indeterminate nondisplaced fracture. 2. Chronic cortical irregularity involving the right transverse process of L1, which may be secondary to congenital nonunion or sequela of prior trauma. 3. Lumbar vertebral body heights are maintained 4. Mild widening of the interspinous space at L2-L3, which may be secondary to ligamentous injury and/or interspinous bursitis.  5. Multilevel degenerative changes of the lumbar spine, which are described in detail above. 6. At L3-L4, there is severe canal stenosis, severe right greater than left subarticular zone stenosis, and moderate bilateral foraminal narrowing. 7. At L5-S1, there is mild canal stenosis, severe right foraminal narrowing, and moderate to severe left foraminal narrowing. 8. Mild dextrocurvature of the lumbar spine. 9. Mild subcutaneous edema overlying the spinous processes at L1-L3, which may be posttraumatic. 10. Prominence of the  common bile duct measuring up to 10 mm, which is favored to be postoperative in the setting of cholecystectomy as well as age-related but consider correlation with liver function tests.  11. Lesser incidental findings described above.     Dictated by (CST): Rashid Hernandez MD on 4/02/2025 at 12:40 PM     Finalized by (CST): Rashid Hernandez MD on 4/02/2025 at 12:56 PM          CT BRAIN OR HEAD (CPT=70450)    Result Date: 4/2/2025  CONCLUSION:  Subacute to remote appearing subdural hemorrhages along the bilateral superior frontal lobes.  Mild mass effect upon the bilateral frontal lobes.  No midline shift.  Mild chronic microvascular ischemic disease.  This report was communicated by telephone to Dr. Tim on 4/2/2025 at 1249 hours.     Dictated by (CST): Martínez Escobedo MD on 4/02/2025 at 12:44 PM     Finalized by (CST): Martínez Escobedo MD on 4/02/2025 at 12:49 PM             ASSESSMENT / PLAN:   This is a 89-year-old female with a history of dementia, history of stroke in the past, hypertension, who presents to the hospital for falls, weakness, left arm pain.     Falls  Generalized weakness and deconditioning most likely  Bilateral frontal lobe subdural hemorrhage, suspect subacute to chronic, midline shift  -family  did not see her fall and hit her head.   -suspect brain findings are not acute  -neurologically her exam is intact and stable   -plan to repeat CT head in AM, neurosurgery was consulted.   -obtain swallow eval as high risk of aspiration   -hold nsaids or blood thinners     Severe dementia, likely Alzheimers   -She currently does not take any medications  -Suspect slow and progressive decline in her functional status, memory  -I did discuss at length with the son and daughter about the normal progression of dementia  -I do believe they will benefit from palliative care consult to further educate on resources available, options if symptoms occur, and eventual transition to hospice when the time comes.  Code  status was attempted by myself however the daughter really felt like she needed some time to think about this, however I do suspect she will make her mother DNR.  At this time in the chart I have to place full code however but I will continue to address.   -Brought up the idea about eventually her having worsening nutrition, the idea of G-tube feeding, versus pleasure feeding, or both.     Fluids: low rate of saline   Diet: easy to eat/soft foods, swallow eval   DVT prophylaxis: hold chemical prophy  Code status: full for now but will continue to address     Trevor Gonzalez DO  [facility] Hospitalist            [1] No Known Allergies

## 2025-04-02 NOTE — ED PROVIDER NOTES
Patient Seen in: [facility] Emergency Department      History     Chief Complaint   Patient presents with    Fall     Stated Complaint: fall x 2 w/back, left arm pain, vomiting    Subjective:   HPI      89-year-old cared for by her daughter primarily at home with dementia with 2 falls today.  Daughter woke up this morning and found her outside the wheelchair she been on the floor if she thinks for several hours.  She was complaining of some mild low back pain and left arm pain.  The left arm pain is not new and she was actually seen for cardiac workup a few weeks ago here.  She did vomit once.  Daughter did get her back in the wheelchair but upon proceeding to leave her alone she attempted to get back in bed and the patient fell again.    Objective:     No pertinent past medical history.            No pertinent past surgical history.              No pertinent social history.                Physical Exam     ED Triage Vitals [04/02/25 1032]   /71   Pulse 90   Resp 18   Temp 98.2 °F (36.8 °C)   Temp src Oral   SpO2 98 %   O2 Device None (Room air)       Current Vitals:   Vital Signs  BP: 132/71  Pulse: 90  Resp: 18  Temp: 98.2 °F (36.8 °C)  Temp src: Oral    Oxygen Therapy  SpO2: 98 %  O2 Device: None (Room air)        Physical Exam  Constitutional:   Appears well-developed. No distress.   Head: Normocephalic and atraumatic.   Eyes: Conjunctivae are normal. Pupils are equal, round, and reactive to light.   Neck: Normal range of motion. Neck supple. No pain posteriorly or gross signs of trauma.  Cardiovascular: Normal rate, regular rhythm and intact distal pulses.    Pulmonary/Chest: Effort normal. No respiratory distress.   Abdominal: Soft. There is no tenderness. There is no guarding.   Musculoskeletal:  pain with palpation of the left humeral region but not in the shoulder or elbow.  No swelling or deformity noted.  Radial pulse strong.  She does have pain in the upper to mid lumbar spine region with  a small overlying superficial stage I pressure ulcer.  No crepitus.  Neurological: Awake and alert, at baseline per daughter.  No gross focal deficits  Skin: Skin is warm and dry.   Psychiatric: Normal mood and affect.  Behavior is normal.   Nursing note and vitals reviewed.    Differential diagnosis includes recurrent falls, head injury, L-spine fracture, UTI.      ED Course     Labs Reviewed   CBC WITH DIFFERENTIAL WITH PLATELET - Abnormal; Notable for the following components:       Result Value    WBC 13.2 (*)     RBC 3.47 (*)     HGB 11.7 (*)     HCT 34.4 (*)     RDW-SD 47.1 (*)     Neutrophil Absolute Prelim 11.73 (*)     Neutrophil Absolute 11.73 (*)     Lymphocyte Absolute 0.55 (*)     All other components within normal limits   BASIC METABOLIC PANEL (8) - Abnormal; Notable for the following components:    Glucose 114 (*)     Potassium 3.4 (*)     eGFR-Cr 54 (*)     All other components within normal limits   URINALYSIS WITH CULTURE REFLEX - Abnormal; Notable for the following components:    Glucose Urine 30 (*)     Ketones Urine Trace (*)     Blood Urine Trace (*)     Protein Urine Trace (*)     Leukocyte Esterase Urine 500 (*)     WBC Urine 6-10 (*)     RBC Urine 3-5 (*)     Squamous Epi. Cells Few (*)     Hyaline Casts Present (*)     All other components within normal limits   TROPONIN I HIGH SENSITIVITY - Normal   RAINBOW DRAW LAVENDER   RAINBOW DRAW LIGHT GREEN   RAINBOW DRAW BLUE   URINE CULTURE, ROUTINE     EKG    Rate, intervals and axes as noted on EKG Report.  Rate: 83  Rhythm: Sinus Rhythm  Reading: no acute ischemic changes                CT SPINE LUMBAR (CPT=72131)    Result Date: 4/2/2025  PROCEDURE: CT SPINE LUMBAR (CPT=72131)  COMPARISON: None.  INDICATIONS: fall x 2 w/back, left arm pain, vomiting  TECHNIQUE:   Multi-planar CT images were obtained without intravenous contrast material.  Automated exposure control for dose reduction was used. Adjustment of the mA and/or kV was done based on  the patient's size. Use of iterative reconstruction technique for dose reduction was used.  Dose information is transmitted to the ACR (American College of Radiology) NRDR (National Radiology Data Registry) which includes the Dose Index Registry.   FINDINGS:  PARASPINAL AREA: There is subcutaneous edema involving the posterior subcutaneous soft tissues overlying the spinous processes at L1-L3 (2:29). BONES:   There is a small cortical irregularity involving the right transverse process of L2 (3:31, 8:50).  There is a chronic irregularity involving the right transverse process of L1 (3:117, 8:50).  The vertebral body heights are maintained.  No aggressive osseous lesion.  Moderate sacroiliac osteoarthrosis. ALIGNMENT:   Mild dextrocurvature of the lumbar spine.  The lumbar lordosis is maintained.  No significant listhesis.  Mild widening of the interspinous space at L2-L3 (6:42).  LUMBAR DISC LEVELS:  There are multilevel advanced degenerative changes of the lumbar spine.  There is multilevel disc height loss, which is most advanced and severe at L3-L4 and L5-S1.  There is multilevel disc ex vacuo phenomena, which is most prominent at L2-L3.  There are multilevel small Schmorl's nodes.  There are multilevel degenerative endplate changes.  There are multilevel ventral disc osteophyte complexes.  There are multilevel posterior disc osteophyte complexes and ligamentum flavum hypertrophy.  The dominant  posterior disc osteophyte complexes at L3-L4 and likely has a superimposed central/right paracentral/foraminal protrusion that results in severe canal stenosis, severe right subarticular zone stenosis, and moderate bilateral foraminal narrowing (6:45).  There is additional multilevel canal stenosis elsewhere, which is moderate at L1-L2 (11:15, 6:39), mild-to-moderate at L2-L3, moderate at L3-L4, and mild at L5-S1.  There is multilevel facet arthropathy.  The facet arthropathy as well as right predominant marginal  osteophytes result in severe right and moderate to severe left foraminal narrowing at L5-S1.  is additional moderate left greater than right foraminal narrowing at L4-L5.   OTHER: The gallbladder surgically absent.  There is prominence of the common bile duct measuring up to 10 mm (5:15).  There is a partially visualized right renal cyst.  The visualized loops are not dilated.  There is colonic diverticulosis.  There is atherosclerotic calcification of the abdominal aorta and its branching vessels.         CONCLUSION:   1. Small cortical irregularity involving the right transverse process of L2, which is concerning for an age indeterminate nondisplaced fracture. 2. Chronic cortical irregularity involving the right transverse process of L1, which may be secondary to congenital nonunion or sequela of prior trauma. 3. Lumbar vertebral body heights are maintained 4. Mild widening of the interspinous space at L2-L3, which may be secondary to ligamentous injury and/or interspinous bursitis.  5. Multilevel degenerative changes of the lumbar spine, which are described in detail above. 6. At L3-L4, there is severe canal stenosis, severe right greater than left subarticular zone stenosis, and moderate bilateral foraminal narrowing. 7. At L5-S1, there is mild canal stenosis, severe right foraminal narrowing, and moderate to severe left foraminal narrowing. 8. Mild dextrocurvature of the lumbar spine. 9. Mild subcutaneous edema overlying the spinous processes at L1-L3, which may be posttraumatic. 10. Prominence of the common bile duct measuring up to 10 mm, which is favored to be postoperative in the setting of cholecystectomy as well as age-related but consider correlation with liver function tests.  11. Lesser incidental findings described above.     Dictated by (CST): Rashid Hernandez MD on 4/02/2025 at 12:40 PM     Finalized by (CST): Rashid Hernandez MD on 4/02/2025 at 12:56 PM          CT BRAIN OR HEAD (CPT=70450)    Result  Date: 4/2/2025  PROCEDURE: CT BRAIN OR HEAD (CPT=70450)  COMPARISON: [facility], MRI BRAIN WWO CONTRAST, 1/13/2007, 1:38 PM.  INDICATIONS: fall x 2 w/back, left arm pain, vomiting  TECHNIQUE: CT images were obtained without contrast material.  Automated exposure control for dose reduction was used.  Dose information is transmitted to the ACR (American College of Radiology) NRDR (National Radiology Data Registry) which includes the Dose Index Registry.  FINDINGS:  PARENCHYMA:  Diffuse low attenuation is seen within the bilateral periventricular white matter compatible with chronic microvascular ischemic disease.  Along the superior aspects of the bilateral frontal lobes, there is extra-axial fluid and soft tissue which measures up to 0.8 cm along the right superior frontal lobe and 0.8 cm along the superior left frontal lobe.  This measures 15 Hounsfield units on the left and 22 Hounsfield units on the right.  There is mild sulcal effacement of the bilateral frontal lobes.  No midline shift.  CSF SPACES: There is mild generalized parenchymal volume loss with concordant enlargement of the ventricular system. No hydrocephalus.  SINUSES: There is mild mucosal thickening of the ethmoid air cells. Remainder of the paranasal sinuses are intact. Mastoid air cells are well aerated.  ORBITS:                 Visualized orbits are unremarkable.  CALVARIUM:     No acute osseous abnormality.  OTHER:                There is atherosclerotic calcification of the intracranial vasculature.           CONCLUSION:  Subacute to remote appearing subdural hemorrhages along the bilateral superior frontal lobes.  Mild mass effect upon the bilateral frontal lobes.  No midline shift.  Mild chronic microvascular ischemic disease.  This report was communicated by telephone to Dr. Tim on 4/2/2025 at 1249 hours.     Dictated by (CST): Martínez Escobedo MD on 4/02/2025 at 12:44 PM     Finalized by (CST): Martínez Escobedo MD on 4/02/2025  at 12:49 PM          XR HUMERUS (MIN 2 VIEWS), LEFT (CPT=73060)    Result Date: 4/2/2025  PROCEDURE: XR HUMERUS (MIN 2 VIEWS), LEFT (CPT=73060)  COMPARISON: [facility], XR CHEST AP PORTABLE (CPT=71045), 3/26/2025, 6:27 PM.  INDICATIONS: Fall x 2 w/back, left arm pain, with vomiting.  Findings and impression:  Normal alignment with no fracture Finalized by (CST): Travis Malagon MD on 4/02/2025 at 11:55 AM          XR CHEST AP PORTABLE  (CPT=71045)    Result Date: 3/26/2025  PROCEDURE: XR CHEST AP PORTABLE  (CPT=71045) TIME: 6:29 p.m.   COMPARISON: None.  INDICATIONS: Chest pain and left arm tingling  TECHNIQUE:   Single view.   FINDINGS:  CARDIAC/VASC: No cardiac silhouette abnormality or cardiomegaly.  Unremarkable pulmonary vasculature.  MEDIAST/PAVEL:   Atherosclerotic aorta with no visible aneurysm.  LUNGS/PLEURA: No significant pulmonary parenchymal abnormalities.  No effusion or pleural thickening. BONES: Scattered mild degenerative endplate changes in the visualized thoracolumbar spine.  Cephalad migration of both humeral heads with narrowing the subacromial spaces compatible with chronic rotator cuff tears. OTHER: Negative.          CONCLUSION: No acute cardiopulmonary abnormality.   Dictated by (CST): Geovanny Agarwal MD on 3/26/2025 at 6:35 PM     Finalized by (CST): Geovanny Agarwal MD on 3/26/2025 at 6:36 PM                MDM          Admission disposition: 4/2/2025  1:43 PM           Medical Decision Making  Patient is stable.  CT results as noted.  Reviewed by neurosurgery Dr. Rushing, he feels these are most likely chronic.  Family has been giving her aspirin daily for the last week or 2 for this left arm pain.  Obviously we will hold aspirin.  No indication for transfusion per Dr. Rushing.  They will see the patient and I ordered a repeat head CT for tomorrow.  The patient will be admitted by the hospitalist.  Dr. Rushing is comfortable the patient going to the floor today.    Problems  Addressed:  Chronic subdural hematoma (HCC): chronic illness or injury with exacerbation, progression, or side effects of treatment    Amount and/or Complexity of Data Reviewed  External Data Reviewed: ECG.     Details: 3/26/25 EKG w/ similar changes when compared to today's EKG  Labs: ordered. Decision-making details documented in ED Course.  Radiology: ordered and independent interpretation performed. Decision-making details documented in ED Course.     Details: By my review of the noncontrast head CT there appears to be evidence of possible mass effect or fluid collection/hemorrhage in the bilateral frontal regions  ECG/medicine tests: ordered and independent interpretation performed. Decision-making details documented in ED Course.    Risk  Parenteral controlled substances.  Decision regarding hospitalization.    Critical Care  Total time providing critical care: minutes (I spent a total of 35 minutes of critical care time in obtaining history, performing a physical exam, bedside monitoring of interventions, collecting and interpreting tests and discussion with consultants but not including time spent performing procedures.)        Disposition and Plan     Clinical Impression:  1. Acute subdural hematoma (HCC)    2. Chronic subdural hematoma (HCC)         Disposition:  Admit  4/2/2025  1:43 pm    Follow-up:  No follow-up provider specified.  We recommend that you schedule follow up care with a primary care provider within the next three months to obtain basic health screening including reassessment of your blood pressure.      Medications Prescribed:  Current Discharge Medication List              Supplementary Documentation:         Hospital Problems       Present on Admission  Date Reviewed: 11/4/2024            ICD-10-CM Noted POA    * (Principal) Acute subdural hematoma (HCC) S06.5XAA 4/2/2025 Unknown

## 2025-04-02 NOTE — PLAN OF CARE
Admitted to 570 from ED. Daughter assisting with admission as pt has dementia and is poor historian. Alert to self. Follows commands but has impulsive tendencies. Sacral redness nonblanchable healing wound, abrasion to middle of wound daughter reports patient scratches and 'picks at' her wound. Right toe wound. Up with assistance, limited endurance. Tolerating diet. Received morphine in ED and reports no pain currently. Plan for repeat CT head tomorrow morning. Palliative consult tomorrow for GOC discussion. Bed in lowest position, call light in reach, frequent rounding, nonskid footwear, fall precautions in place.

## 2025-04-02 NOTE — CONSULTS
[facility] Neurosurgery Consult    Arely Yadav Patient Status:  Emergency    10/27/1935 MRN [id number]   Location [facility] EMERGENCY DEPARTMENT Attending Sachin Tim MD   Hosp Day # 0 PCP WENDI SUE MD     REASON FOR CONSULTATION:  SDH    HISTORY OF PRESENT ILLNESS:  Arely Yadav is a(n) 89 year old female with history of dementia who presents after a series of falls at home.  She is accompanied by her son who assists with providing history and translation as patient is primarily Thai-speaking.    The patient reportedly fell out of her wheelchair this morning and slid onto her bottom.  Per ED notes, patient's daughter had reported that the patient was found outside of her wheelchair on the floor and was likely there for several hours.  Patient was brought to the hospital for evaluation.  Per son, patient has been on aspirin daily for the past 2 weeks for left arm pain.  Her last dose of aspirin was yesterday. Head CT demonstrates bilateral frontal SDH for which neurosurgery was consulted.     At present, patient denies any headache, visual disturbances such as blurred or double or loss of vision or dizziness or other neurologic complaints.  Per the son at bedside, the patient is at her neurologic baseline.    PAST MEDICAL HISTORY:  Past Medical History:    Dementia (HCC)    Essential hypertension    Glaucoma    Stroke (HCC)       PAST SURGICAL HISTORY:  No past surgical history on file.    FAMILY HISTORY:  family history includes Breast Cancer 2nd occurrence in her sister; Pancreatic Cancer in an other family member; Uterine Cancer in her sister; cirrhosis in an other family member.    SOCIAL HISTORY:   reports that she has never smoked. She has never used smokeless tobacco. She reports that she does not drink alcohol and does not use drugs.    ALLERGIES:  Allergies[1]    MEDICATIONS:  Prescriptions Prior to Admission[2]  No current facility-administered medications for this encounter.        REVIEW OF SYSTEMS:  Comprehensive Review of Systems obtained, and is negative other than that mentioned in the History of Present Illness.      PHYSICAL EXAMINATION:  VITAL SIGNS: /70   Pulse 92   Temp 98.2 °F (36.8 °C) (Oral)   Resp 20   SpO2 99%   GENERAL:  Patient is a 89 year old female in no acute distress.  HEENT:  Normocephalic, atraumatic    NEUROLOGICAL:  This patient is awake and alert.  Oriented to self only.  PERRLA 3+. EOMI.  Face grossly symmetric.  Moves all extremities x 4 spontaneously to gravity.  Refuses to follow commands to move extremities to assess strength.    DIAGNOSTIC DATA:   Lab Results   Component Value Date    WBC 13.2 04/02/2025    HGB 11.7 04/02/2025    HCT 34.4 04/02/2025    .0 04/02/2025    CREATSERUM 1.01 04/02/2025    BUN 14 04/02/2025     04/02/2025    K 3.4 04/02/2025     04/02/2025    CO2 29.0 04/02/2025     04/02/2025    CA 9.6 04/02/2025       IMAGING:  CT SPINE CERVICAL (CPT=72125)    Result Date: 4/2/2025  CONCLUSION:   1. No acute fracture or traumatic listhesis of the cervical spine. 2. Multilevel degenerative changes of the cervical spine, which are described above. 3. Reversal the cervical lordosis.  4. Mild dextrocurvature of the cervical spine.    Dictated by (CST): Rashid Hernandez MD on 4/02/2025 at 2:43 PM     Finalized by (CST): Rashid Hernandez MD on 4/02/2025 at 2:49 PM          CT SPINE LUMBAR (CPT=72131)    Result Date: 4/2/2025  CONCLUSION:   1. Small cortical irregularity involving the right transverse process of L2, which is concerning for an age indeterminate nondisplaced fracture. 2. Chronic cortical irregularity involving the right transverse process of L1, which may be secondary to congenital nonunion or sequela of prior trauma. 3. Lumbar vertebral body heights are maintained 4. Mild widening of the interspinous space at L2-L3, which may be secondary to ligamentous injury and/or interspinous bursitis.  5.  Multilevel degenerative changes of the lumbar spine, which are described in detail above. 6. At L3-L4, there is severe canal stenosis, severe right greater than left subarticular zone stenosis, and moderate bilateral foraminal narrowing. 7. At L5-S1, there is mild canal stenosis, severe right foraminal narrowing, and moderate to severe left foraminal narrowing. 8. Mild dextrocurvature of the lumbar spine. 9. Mild subcutaneous edema overlying the spinous processes at L1-L3, which may be posttraumatic. 10. Prominence of the common bile duct measuring up to 10 mm, which is favored to be postoperative in the setting of cholecystectomy as well as age-related but consider correlation with liver function tests.  11. Lesser incidental findings described above.     Dictated by (CST): Rashid Hernandez MD on 4/02/2025 at 12:40 PM     Finalized by (CST): Rashid Hernandez MD on 4/02/2025 at 12:56 PM          CT BRAIN OR HEAD (CPT=70450)    Result Date: 4/2/2025  CONCLUSION:  Subacute to remote appearing subdural hemorrhages along the bilateral superior frontal lobes.  Mild mass effect upon the bilateral frontal lobes.  No midline shift.  Mild chronic microvascular ischemic disease.  This report was communicated by telephone to Dr. Tim on 4/2/2025 at 1249 hours.     Dictated by (CST): Martínez Escobedo MD on 4/02/2025 at 12:44 PM     Finalized by (CST): Martínez Escobedo MD on 4/02/2025 at 12:49 PM            ASSESSMENT:    88 y/o female with history of dementia who presents after fall, recent aspirin use x 2 weeks, found to have small bilateral frontal SDH which appears to be subacute to chronic.  She is AxOx1 at baseline without focal signs on examination.  CT lumbar without acute findings.  CT cervical spine demonstrates multilevel degeneration without any acute fractures or dislocation.     Plan:    1.  No role for acute neurosurgical intervention  2.  Recommend hospital admission, repeat head CT tomorrow morning  3.  Hold  antiplatelet, anticoagulant agents  4.  Cardene drip as needed to maintain SBP < 140  5.  Medical management per hospitalist      Patient seen and examined. Patient agrees with the plan and all questions were answered. Discussed with Dr. Dotson and Dr. Mabry.       Keshia Neri M.S., PA-C  [address], [facility] [address]  [place] [address]  [phone]  4/2/2025 3:57 PM          [1] No Known Allergies  [2] (Not in a hospital admission)

## 2025-04-02 NOTE — ED INITIAL ASSESSMENT (HPI)
Pt to ED via triage with daughter c/o fall around 0400 and was found down by daughter at 0830. After getting her up, pt was c/o low back pain and left arm pain. Then had another fall PTA. +N/V. Denies blood thinners. Hx of dementia

## 2025-04-02 NOTE — ED QUICK NOTES
Orders for admission, patient is aware of plan and ready to go upstairs. Any questions, please call ED VIKTORIYA Price at extension [phone].     Patient Covid vaccination status: Unvaccinated     COVID Test Ordered in ED: None    COVID Suspicion at Admission: N/A    Running Infusions:  None    Mental Status/LOC at time of transport: a/o x 1    Other pertinent information: Pt is from home with daughter. Pt has dementia. Pt ambulatory with walker and assist for short distances. Pt was able to ambulate with walker and assist to BR and urinated. Does wear depends.  CIWA score: N/A   NIH score:  N/A

## 2025-04-03 ENCOUNTER — APPOINTMENT (OUTPATIENT)
Dept: CT IMAGING | Facility: HOSPITAL | Age: 89
End: 2025-04-03
Attending: INTERNAL MEDICINE
Payer: MEDICARE

## 2025-04-03 PROBLEM — S32.029A: Status: ACTIVE | Noted: 2025-04-03
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
## 2025-04-03 PROBLEM — Z71.89 ADVANCE CARE PLANNING: Status: ACTIVE | Noted: 2025-04-03

## 2025-04-03 PROBLEM — I10 ESSENTIAL HYPERTENSION: Status: ACTIVE | Noted: 2025-04-03

## 2025-04-03 PROBLEM — S06.5X0A SUBDURAL HEMORRHAGE FOLLOWING INJURY, NO LOSS OF CONSCIOUSNESS (HCC): Status: ACTIVE | Noted: 2025-04-03

## 2025-04-03 PROBLEM — Z51.5 PALLIATIVE CARE BY SPECIALIST: Status: ACTIVE | Noted: 2025-04-03

## 2025-04-03 PROBLEM — Z79.82 LONG-TERM USE OF ASPIRIN THERAPY: Status: ACTIVE | Noted: 2025-04-03

## 2025-04-03 PROBLEM — Z71.89 GOALS OF CARE, COUNSELING/DISCUSSION: Status: ACTIVE | Noted: 2025-04-03

## 2025-04-03 LAB
ATRIAL RATE: 83 BPM
P AXIS: 59 DEGREES
P-R INTERVAL: 126 MS
POTASSIUM SERPL-SCNC: 3.8 MMOL/L (ref 3.5–5.1)
Q-T INTERVAL: 370 MS
QRS DURATION: 74 MS
QTC CALCULATION (BEZET): 434 MS
R AXIS: 57 DEGREES
T AXIS: 54 DEGREES
VENTRICULAR RATE: 83 BPM

## 2025-04-03 PROCEDURE — 70450 CT HEAD/BRAIN W/O DYE: CPT | Performed by: INTERNAL MEDICINE

## 2025-04-03 PROCEDURE — 99223 1ST HOSP IP/OBS HIGH 75: CPT | Performed by: REGISTERED NURSE

## 2025-04-03 NOTE — CM/SW NOTE
04/03/25 1300   CM/ESTEFANI Referral Data   Referral Source Physician   Reason for Referral Discharge planning   Informant Daughter      Reason for  Pt. is Limited English Proficient   Medical Hx   Does patient have an established PCP? Yes   Patient Info   Patient's Home Environment House   Patient lives with Daughter   Patient Status Prior to Admission   Independent with ADLs and Mobility No   Services in place prior to admission DME/Supplies at home   Type of DME/Supplies Wheelchair;Rollator Walker;Wheeled Walker;Grab Bars;Shower Chair   Discharge Needs   Anticipated D/C needs To be determined     ESTEFANI received MDO for SW consult- hospice consult information session with [facility], caregiver resources. SW met with daughter at bedside, introduced self and role to daughter. Daughter provided above information. Patient lives with daughter at address on file. Daughter is primary caregiver for patient. Per daughter, patient has commode, walker, wheelchair, bed rails grab bars, shower chair, . Patient has no hx of  services or TANISHA. ESTEFANI received MDO for hospice. ESTEFANI spoke with [facility] hospice liaison for referral.  [facility] team to see pt today.    ESTEFANI/ISRA to remain available for support and/or discharge planning.     Amy Hoffman, MSW, LSW   x [phone]

## 2025-04-03 NOTE — PROGRESS NOTES
Hospice referral received from Shruti GUTIÉRREZ. [facility] will follow up with family to set up informational meeting.     Addendum: Meeting with daughter today at 2 pm.    Mariela Dickson  [facility] Liaison  [phone]

## 2025-04-03 NOTE — PLAN OF CARE
Problem: Patient Centered Care  Goal: Patient preferences are identified and integrated in the patient's plan of care  Description: Interventions:- What would you like us to know as we care for you? - Provide timely, complete, and accurate information to patient/family- Incorporate patient and family knowledge, values, beliefs, and cultural backgrounds into the planning and delivery of care- Encourage patient/family to participate in care and decision-making at the level they choose- Honor patient and family perspectives and choices  Outcome: Progressing     Problem: Patient/Family Goals  Goal: Patient/Family Long Term Goal  Description: Patient's Long Term Goal: Interventions:- - See additional Care Plan goals for specific intervention  Outcome: Progressing  Goal: Patient/Family Short Term Goal  Description: Patient's Short Term Goal: Interventions: - - See additional Care Plan goals for specific interventions  Outcome: Progressing

## 2025-04-03 NOTE — OCCUPATIONAL THERAPY NOTE
OCCUPATIONAL THERAPY EVALUATION - INPATIENT     Room Number: 570/570-A  Evaluation Date: 4/3/2025  Type of Evaluation: Initial  Presenting Problem: sdh following fall    Physician Order: IP Consult to Occupational Therapy  Reason for Therapy: ADL/IADL Dysfunction and Discharge Planning    OCCUPATIONAL THERAPY ASSESSMENT   Patient is a 89 year old female admitted 4/2/2025 following a fall resulting in sdh and low back pain. Per neurosurgery, no surgical intervention indicated at this time.  Prior to admission, patient was living w/ her daughter in a 1 story home. Pt's daughter is her primary CG. While she is at work pt's son satys w/ her during the day. Pt spends a large part of her day in bed. Pt was able to transfer bed<>wc<>commode w/ mod I doing spt. Pt's daughter assisted w/ bathing and dressing. Pt was able to feed herself. Pt is oriented to self at baseline and often times does not recognize family. Patient is currently functioning below baseline with self feeding and mobility.  Patient is requiring total/maximum assist as a result of the following impairments:  confusion, agitation . Occupational Therapy will continue to follow for duration of hospitalization.    Patient will benefit from continued skilled OT Services. Upon discharge pt would benefit from 24 hour care and increased assistance;familiar setting w/ a Malay speaker would be beneficial.     PLAN DURING HOSPITALIZATION  OT Device Recommendations: TBD  OT Treatment Plan: Patient/Family training, Patient/Family education, Endurance training, Functional transfer training     OCCUPATIONAL THERAPY MEDICAL/SOCIAL HISTORY   Problem List  Principal Problem:    Acute subdural hematoma (HCC)  Active Problems:    Chronic subdural hematoma (HCC)    Subdural hemorrhage following injury, no loss of consciousness (HCC)    Unspecified fracture of second lumbar vertebra, initial encounter for closed fracture (HCC)    Essential hypertension    Unspecified fall,  initial encounter    Long-term use of aspirin therapy    Goals of care, counseling/discussion    Advance care planning    Palliative care by specialist    HOME SITUATION  Type of Home: House  Lives With: -- (pt's daughter works during the day and her son stays w/ here)  Toilet and Equipment: 3-in-1 commode  Shower/Tub and Equipment: Shower chair  Other Equipment: -- (wc,rw,bed rail)  Drives: No  Patient Regularly Uses: Wheelchair (commode, bedrails)    Stairs in Home: none  Use of Assistive Device(s): wc    Prior Level of [place]: Prior to admission, patient was living w/ her daughter in a 1 story home. Pt's daughter is her primary CG. While she is at work pt's son satys w/ her during the day. Pt spends a large part of her day in bed. Pt was able to transfer bed<>wc<>commode w/ mod I doing spt. Pt's daughter assisted w/ bathing and dressing. Pt was able to feed herself. Pt is oriented to self at baseline and often times does not recognize family    SUBJECTIVE  Pt offering no conversation, but throwing things, and attempting to pinch/hit therapists and her daughter    OCCUPATIONAL THERAPY EXAMINATION      OBJECTIVE  Precautions:  needed; Bed/chair alarm (Physically aggressive: kicking, swinging arms, pinching)  Fall Risk: High fall risk    PAIN ASSESSMENT  Rating: Unable to rate    ACTIVITY TOLERANCE  Fair, limited by resistive behavior and agitation    O2 SATURATIONS  Activity on room air    Communication: pt is [place] speaking and benefits from translation assistance    Behavioral/Emotional/Social: resistive,agitated    RANGE OF MOTION   Upper extremity ROM is within functional limits     STRENGTH ASSESSMENT  Upper extremity strength is within functional limits     ACTIVITIES OF DAILY LIVING ASSESSMENT  AM-PAC ‘6-Clicks’ Inpatient Daily Activity Short Form  How much help from another person does the patient currently need…  -   Putting on and taking off regular lower body clothing?: A Lot  -    Bathing (including washing, rinsing, drying)?: A Lot  -   Toileting, which includes using toilet, bedpan or urinal? : A Lot  -   Putting on and taking off regular upper body clothing?: A Lot  -   Taking care of personal grooming such as brushing teeth?: A Lot  -   Eating meals?: A Lot    AM-PAC Score:  Score: 12  Approx Degree of Impairment: 66.57%  Standardized Score (AM-PAC Scale): 30.6  CMS Modifier (G-Code): CL    FUNCTIONAL ADL ASSESSMENT  Eating: max assist, pt refusing daughters efforts to drink  Grooming: max assist  UB Dressing: max assist  LB Dressing: max assist  Toileting: max assist    Skilled Therapy Provided: OT orders received and chart reviewed. RN contacted prior to start of care. Treatment coordinated w/ PT. Pt's daughter agreeable to participation in therapy. Gait belt used during dynamic activity. Pt received in bed asleep but readily aroused to her daughter Speaking to her in Tajik. Daughter reporting that pt is confused. Pt not following commands to assist w/ activity. Pt resisting attempts at position changes. Pt attempting to swing at therapists. When provided w/ washcloths to hold to discourage pinching, pt threw them. Pt required total assist x 2 to transition supine<>sit at eob. Pt required mod/max a to maintain unsupported sitting due to attempts to return top bed. With increased time pt became less restless and  was able to maintain sitting w/ min/cga. On initial contact pt required max a x 2 for squat pivot transfer bed<>chair. Once up to chair pt presenting as less resistive and relaxed    At end of session pt remaining up in chair w/ all needs in reach and alarm on;legs elevated, table in place and daughter at bedside. RN aware of pt's status and performance in therapy      EDUCATION PROVIDED  Patient Education : Role of Occupational Therapy; Plan of Care; Functional Transfer Techniques; Fall Prevention  Patient's Response to Education: Does Not Demonstrate Skills Needed for  Learning; Demonstrates Poor Carry Over to Information  Family/Caregiver's Response to Education: Verbalized Understanding    The patient's Approx Degree of Impairment: 66.57% has been calculated based on documentation in the [facility] '6 clicks' Inpatient Daily Activity Short Form.  Research supports that patients with this level of impairment may benefit from IRF.  Final disposition will be made by interdisciplinary medical team.     Patient End of Session: Up in chair, Needs met, Call light within reach, RN aware of session/findings, All patient questions and concerns addressed, Alarm set, Family present    OT Goals  Patients self stated goal is: unstated (daughter would ultimately like to have the pt return home)     Patient will transfer supine<>sit with min a  Comment:     Patient will complete self feeding with set up  Comment:     Patient will tolerate standing for 1 minute in prep for adls with rw and mod a   Comment:              Goals  on: 4/10/25  Frequency: trial 1-2 visits    Patient Evaluation Complexity Level:   Occupational Profile/Medical History MODERATE - Expanded review of history including review of medical or therapy record   Specific performance deficits impacting engagement in ADL/IADL MODERATE  3 - 5 performance deficits   Client Assessment/Performance Deficits HIGH - Comorbidities and significant modifications of tasks    Clinical Decision Making MODERATE - Analysis of occupational profile, detailed assessments, several treatment options    Overall Complexity MODERATE     Therapeutic Activity: 23 minutes

## 2025-04-03 NOTE — PLAN OF CARE
Problem: Patient Centered Care  Goal: Patient preferences are identified and integrated in the patient's plan of care  Description: Interventions:- What would you like us to know as we care for you? From home with family   - Provide timely, complete, and accurate information to patient/family- Incorporate patient and family knowledge, values, beliefs, and cultural backgrounds into the planning and delivery of care- Encourage patient/family to participate in care and decision-making at the level they choose- Honor patient and family perspectives and choices  Outcome: Progressing     Problem: Patient/Family Goals  Goal: Patient/Family Long Term Goal  Description: Patient's Long Term Goal:   Interventions:-   - See additional Care Plan goals for specific interventions  Outcome: Progressing  Goal: Patient/Family Short Term Goal  Description: Patient's Short Term Goal:   Interventions: -   - See additional Care Plan goals for specific interventions  Outcome: Progressing

## 2025-04-03 NOTE — PHYSICAL THERAPY NOTE
PHYSICAL THERAPY EVALUATION - INPATIENT     Room Number: 570/570-A  Evaluation Date: 4/3/2025  Type of Evaluation: Initial   Physician Order: PT Eval and Treat    Presenting Problem: Acute on chronic bilatearal frontal lobe subdural hematomas  Co-Morbidities : dementia, h/o CVA, HTN, CKDIII  Reason for Therapy: Mobility Dysfunction and Discharge Planning    PHYSICAL THERAPY ASSESSMENT   Patient is a 89 year old female admitted 4/2/2025 following a fall out of bed with acute on chronic subdural hematoma  Prior to admission, patient's baseline is non-ambulatory, completing bed<>commode<>wheelchair transfers independently.  Patient is currently functioning below baseline with bed mobility, transfers, and maintaining seated position.  Patient is requiring moderate assist, maximum assist, and dependent as a result of the following impairments: cognitive deficits (does not follow commands to participate in therapy, physically resisting/aggressive), medical status, and decreased compliance/participation.  Physical therapy will continue to follow for duration of hospitalization.    Upon discharge pt would benefit from a more supportive living situation, given her cognitive deficits, 24 hour care would be ideal.  Pt would benefit from a trial of home health physical therapy in order to determine if she is able to consistently participate in a therapy program.    PLAN DURING HOSPITALIZATION  Nursing Mobility Recommendation : Lift Equipment     PT Treatment Plan: Bed mobility, Endurance, Energy conservation, Patient education, Family education, Transfer training  Rehab Potential : Guarded (Pt not following commands to actively participate in therapy)  Frequency (Obs):  (2-3x per week)     PHYSICAL THERAPY MEDICAL/SOCIAL HISTORY   History related to current admission: The pt is an 88 y/o F who presented to the ED on 4/2/25 with 2 falls.  Pt found on the floor by family, suspected to be down for several hours.  Based upon  camera, pt slid forward off bed to floor and was unable to get up.  Pt was admitted with bilateral acute on subacute frontal SDH.    Problem List  Principal Problem:    Acute subdural hematoma (HCC)  Active Problems:    Chronic subdural hematoma (HCC)    Subdural hemorrhage following injury, no loss of consciousness (HCC)    Unspecified fracture of second lumbar vertebra, initial encounter for closed fracture (HCC)    Essential hypertension    Unspecified fall, initial encounter    Long-term use of aspirin therapy    Goals of care, counseling/discussion    Advance care planning    Palliative care by specialist      HOME SITUATION  Type of Home: House                        Lives With: Daughter    Drives: No   Patient Regularly Uses: Wheelchair (commode, bedrails)     Prior Level of [place]: The pt has baseline dementia and has support from her daughter, in the evenings, and son, during the day.  However, the pt is left alone 4-5 hours per day.  Pt is typically able to transfer from bed<>commode<>wheelchair independently when alone and with minimal assistance when family is present.  Family assists with I/ADL's.   Dtr reports pt is A&Ox1 at baseline, pt does not always recognize family.    SUBJECTIVE  Pt speaks exclusively in Citizen of [place].  Pt's daughter, Arely, interprets throughout.  Pt using foul language.    PHYSICAL THERAPY EXAMINATION   OBJECTIVE  Precautions:  needed, Bed/chair alarm (Physically aggressive: kicking, swinging arms, pinching)  Fall Risk: High fall risk    WEIGHT BEARING RESTRICTION       PAIN ASSESSMENT  Rating: Unable to rate  Location: no signs of pain noted       COGNITION  Overall Cognitive Status:  Impaired  Orientation Level:  oriented to person  Memory:  decreased recall of biographical information, decreased recall of recent events, decreased long term memory, and decreased short term memory  Following Commands:  does not follow commands  Initiation: hand over hand to  initiate tasks  Safety Judgement:  decreased awareness of need for assistance and decreased awareness of need for safety    RANGE OF MOTION AND STRENGTH ASSESSMENT  Upper extremity ROM and strength are within functional limits   Lower extremity ROM is within functional limits   Lower extremity strength is within functional limits     BALANCE  Static Sitting: Poor  Dynamic Sitting: Poor -  Static Standing: Dependent  Dynamic Standing: Not tested      AM-PAC '6-Clicks' INPATIENT SHORT FORM - BASIC MOBILITY  How much difficulty does the patient currently have...  Patient Difficulty: Turning over in bed (including adjusting bedclothes, sheets and blankets)?: Unable   Patient Difficulty: Sitting down on and standing up from a chair with arms (e.g., wheelchair, bedside commode, etc.): Unable   Patient Difficulty: Moving from lying on back to sitting on the side of the bed?: Unable   How much help from another person does the patient currently need...   Help from Another: Moving to and from a bed to a chair (including a wheelchair)?: Total   Help from Another: Need to walk in hospital room?: Total   Help from Another: Climbing 3-5 steps with a railing?: Total     AM-PAC Score:  Raw Score: 6   Approx Degree of Impairment: 100%   Standardized Score (AM-PAC Scale): 23.55   CMS Modifier (G-Code): CN    FUNCTIONAL ABILITY STATUS  Functional Mobility/Gait Assessment  Gait Assistance: Not tested (unable to ambulate at baseline)  Rolling: maximum assist  Supine to Sit: dependent  Sit to Supine:  not tested  Sit to Stand: dependent    Squat Pivot trasnfer: 2 person Max A    Exercise/Education Provided:  Bed mobility  Functional activity tolerated  Transfer training    Skilled Therapy Provided: The pt was approached for therapy lying supine in bed.  Pt easily aroused, but aggressive upon waking, striking out with arms at therapist and daughter.  Pt required maximum assistance for supine>sit to EOB.  Pt sat upright at EOB x10 minutes  with Mod-Max A.  Pt reaching for and pinching therapist and daughter initially, but with physical restraint and soothing words, pt calms.  When provided a towel, pt threw towel across room.  Gait belt applied.  Pt completed squat pivot transfer from bed to chair with 2 person Max A.  Pt appears more calm and relaxed once sitting in chair.  Pt appears more calm once sitting in chair.    The patient's Approx Degree of Impairment: 100% has been calculated based on documentation in the [facility] '6 clicks' Inpatient Basic Mobility Short Form.  Research supports that patients with this level of impairment may benefit from long term care.  Final disposition will be made by interdisciplinary medical team.    Patient End of Session: Up in chair, Needs met, Call light within reach, RN aware of session/findings, Hospital anti-slip socks, Alarm set, Family present    CURRENT GOALS  Goals to be met by: 4/11/25  Patient Goal Patient's self-stated goal is: not stated   Goal #1 Patient is able to demonstrate supine - sit EOB @ level: minimum assistance     Goal #1   Current Status    Goal #2 Patient is able to demonstrate transfers EOB to/from Chair/Wheelchair at assistance level: moderate assistance with none     Goal #2  Current Status    Goal #3 Patient will follow commands 50% of the time to participate in therapy.   Goal #3   Current Status    Goal #4    Goal #4   Current Status    Goal #5    Goal #5   Current Status    Goal #6    Goal #6  Current Status      Patient Evaluation Complexity Level:  History High - 3 or more personal factors and/or co-morbidities   Examination of body systems Moderate - addressing a total of 3 or more elements   Clinical Presentation  Moderate - Evolving   Clinical Decision Making  Moderate Complexity     Therapeutic Activity:  15 minutes

## 2025-04-03 NOTE — PROGRESS NOTES
[facility] Hospitalist Progress Note     CC: Hospital Follow up    PCP: WENDI SUE MD       Assessment/Plan:   This is a 89-year-old female with a history of dementia, history of stroke in the past, hypertension, who presents to the hospital for falls, weakness, left arm pain.      Falls  Generalized weakness and deconditioning most likely, progressive dementia   Bilateral frontal lobe subdural hemorrhage, suspect subacute to chronic, no midline shift  -suspect brain findings are not acute  -neurologically her exam is intact and stable   -repeat CT head this AM stable  -obtain swallow eval as high risk of aspiration   -hold nsaids or blood thinners      Severe dementia, likely Alzheimers   -She currently does not take any medications  -Suspect slow and progressive decline in her functional status, memory  -I did discuss at length with the son and daughter about the normal progression of dementia  -I do believe they will benefit from palliative care consult to further educate on resources available, options if symptoms occur, and eventual transition to hospice when the time comes.    -appreciate palliative consult today  -hospice consult for educational meeting   -Brought up the idea about eventually her having worsening nutrition, the idea of G-tube feeding, versus pleasure feeding, or both so they are aware of the natural progression of dementia.     Insomnia   -melatonin HS  -seroquel prn, will decrease dose to 12.5mg      Diet: easy to eat/soft foods, swallow eval   DVT prophylaxis: hold chemical prophy  Code status: DNR     Asrar Carlos Pedroza [facility] Hospitalist      Subjective:     Discussed with daughter at bedside overall goals of care and plan. Shruti from palliative at bedside.   Patient was resting comfortably.     OBJECTIVE:    Blood pressure 110/63, pulse 83, temperature 97.9 °F (36.6 °C), temperature source Axillary, resp. rate 16, weight 112 lb 7 oz (51 kg), SpO2 99%.    Temp:   [97.9 °F (36.6 °C)-98.5 °F (36.9 °C)] 97.9 °F (36.6 °C)  Pulse:  [55-83] 83  Resp:  [14-20] 16  BP: (108-140)/(47-67) 110/63  SpO2:  [95 %-99 %] 99 %      Intake/Output:    Intake/Output Summary (Last 24 hours) at 4/3/2025 1423  Last data filed at 4/3/2025 0600  Gross per 24 hour   Intake 290 ml   Output --   Net 290 ml       Last 3 Weights   04/02/25 1708 112 lb 7 oz (51 kg)   03/26/25 1751 95 lb (43.1 kg)   10/07/24 1125 131 lb 6.4 oz (59.6 kg)       /63 (BP Location: Right arm)   Pulse 83   Temp 97.9 °F (36.6 °C) (Axillary)   Resp 16   Wt 112 lb 7 oz (51 kg)   SpO2 99%   BMI 20.56 kg/m²   General: Sleeping, but easily awakened and does speak to me, only speaks in [place]  Heart: Regular rate and rhythm  Abdomen: soft, non tender  Extremities: No edema    Data Review:       Labs:     Recent Labs   Lab 04/02/25  1111   RBC 3.47*   HGB 11.7*   HCT 34.4*   MCV 99.1   MCH 33.7   MCHC 34.0   RDW 13.1   NEPRELIM 11.73*   WBC 13.2*   .0         Recent Labs   Lab 04/02/25  1111 04/03/25  0605   *  --    BUN 14  --    CREATSERUM 1.01  --    EGFRCR 54*  --    CA 9.6  --      --    K 3.4* 3.8     --    CO2 29.0  --        No results for input(s): \"ALT\", \"AST\", \"ALB\", \"AMYLASE\", \"LIPASE\", \"LDH\" in the last 168 hours.    Invalid input(s): \"ALPHOS\", \"TBIL\", \"DBIL\", \"TPROT\"      Imaging:  CT BRAIN OR HEAD (CPT=70450)    Result Date: 4/3/2025  CONCLUSION:   Chronic appearing bilateral subdural hemorrhages are unchanged since 4/2/2025.  No acute hemorrhage or midline shift.  Bilateral parenchymal calcifications are nonspecific but can be seen with remote inflammation or remote infection such as neurocysticercosis which is unchanged.  Mild chronic microvascular ischemic disease.     Dictated by (CST): Martínez Escobedo MD on 4/03/2025 at 8:37 AM     Finalized by (CST): Martínez Escobedo MD on 4/03/2025 at 8:43 AM          CT SPINE CERVICAL (CPT=72125)    Result Date: 4/2/2025  CONCLUSION:   1. No acute  fracture or traumatic listhesis of the cervical spine. 2. Multilevel degenerative changes of the cervical spine, which are described above. 3. Reversal the cervical lordosis.  4. Mild dextrocurvature of the cervical spine.    Dictated by (CST): Rashid Hernandez MD on 4/02/2025 at 2:43 PM     Finalized by (CST): Rashid Hernandez MD on 4/02/2025 at 2:49 PM          CT SPINE LUMBAR (CPT=72131)    Result Date: 4/2/2025  CONCLUSION:   1. Small cortical irregularity involving the right transverse process of L2, which is concerning for an age indeterminate nondisplaced fracture. 2. Chronic cortical irregularity involving the right transverse process of L1, which may be secondary to congenital nonunion or sequela of prior trauma. 3. Lumbar vertebral body heights are maintained 4. Mild widening of the interspinous space at L2-L3, which may be secondary to ligamentous injury and/or interspinous bursitis.  5. Multilevel degenerative changes of the lumbar spine, which are described in detail above. 6. At L3-L4, there is severe canal stenosis, severe right greater than left subarticular zone stenosis, and moderate bilateral foraminal narrowing. 7. At L5-S1, there is mild canal stenosis, severe right foraminal narrowing, and moderate to severe left foraminal narrowing. 8. Mild dextrocurvature of the lumbar spine. 9. Mild subcutaneous edema overlying the spinous processes at L1-L3, which may be posttraumatic. 10. Prominence of the common bile duct measuring up to 10 mm, which is favored to be postoperative in the setting of cholecystectomy as well as age-related but consider correlation with liver function tests.  11. Lesser incidental findings described above.     Dictated by (CST): Rashid Hernandez MD on 4/02/2025 at 12:40 PM     Finalized by (CST): Rashid Hernandez MD on 4/02/2025 at 12:56 PM          CT BRAIN OR HEAD (CPT=70450)    Result Date: 4/2/2025  CONCLUSION:  Subacute to remote appearing subdural hemorrhages along the  bilateral superior frontal lobes.  Mild mass effect upon the bilateral frontal lobes.  No midline shift.  Mild chronic microvascular ischemic disease.  This report was communicated by telephone to Dr. Tim on 4/2/2025 at 1249 hours.     Dictated by (CST): Martínez Escobedo MD on 4/02/2025 at 12:44 PM     Finalized by (CST): Martínez Escobedo MD on 4/02/2025 at 12:49 PM             Meds:      ceFAZolin  2 g Intravenous Q8H    melatonin  5 mg Oral Nightly         acetaminophen    ondansetron    QUEtiapine

## 2025-04-03 NOTE — PROGRESS NOTES
[facility] Lia[facility] and VIKTORIYA Duran met with the patient's daughter Arely and son Kirby at bedside to discuss comfort care. Informational meeting complete. Hospice philosophy, Medicare benefit, and levels of care discussed. All questions answered. They would like to meet again with Shruti GUTIÉRREZ and discuss POLST before making a decision. [facility] will follow to support the patient and family. MD Gonzlaez, VIKTORIYA Castellano, and ESTEFANI Reyes informed.     Mariela Dickson  [facility]  [phone] (After-hours)

## 2025-04-03 NOTE — PROGRESS NOTES
[facility]  part of [facility]    Neurosurgery Progress Note    Arely Yadav Patient Status:  Inpatient    10/27/1935 MRN [id number]   Location [facility] 5SW/SE Attending Trevor Gonzalez, DO   Hosp Day # 1 PCP WENDI SUE MD     Subjective:  Arely Yadav is a(n) 89 year old female admitted to the hospital for bilateral frontal SDH while consuming aspirin.  Patient is resting comfortably this morning.  She is oriented x 1.    Vital Signs:    Temp:  [98.2 °F (36.8 °C)-98.5 °F (36.9 °C)] 98.2 °F (36.8 °C)  Pulse:  [55-92] 72  Resp:  [14-20] 16  BP: (108-141)/(47-71) 127/50  SpO2:  [95 %-99 %] 98 %    I/O:  I/O last 3 completed shifts:  In: 290 [P.O.:290]  Out: -     Inpatient Medications:    Current Facility-Administered Medications:     acetaminophen (Tylenol Extra Strength) tab 500 mg, 500 mg, Oral, Q4H PRN    ondansetron (Zofran) 4 MG/2ML injection 4 mg, 4 mg, Intravenous, Q6H PRN    QUEtiapine (SEROquel) tab 25 mg, 25 mg, Oral, Nightly PRN    melatonin cap/tab 5 mg, 5 mg, Oral, Nightly    Labs:  Lab Results   Component Value Date    WBC 13.2 (H) 2025    HGB 11.7 (L) 2025    .0 2025    BUN 14 2025     2025    K 3.8 2025    CO2 29.0 2025     (H) 2025    ALB 3.8 2025         Neurological Exam:  Sleepy, oriented x 1 to self  PERRL, EOMI  Face symmetric  Moving all extremities x 4 spontaneously to gravity    Abdomen:  Soft, non-distended, non-tender, with no rebound or guarding.  No peritoneal signs.   Extremities:  Non-tender, no lower extremity edema noted.        Imaging:  CT BRAIN OR HEAD (CPT=70450)    Result Date: 4/3/2025  CONCLUSION:   Chronic appearing bilateral subdural hemorrhages are unchanged since 2025.  No acute hemorrhage or midline shift.  Bilateral parenchymal calcifications are nonspecific but can be seen with remote inflammation or remote infection such as neurocysticercosis which  is unchanged.  Mild chronic microvascular ischemic disease.     Dictated by (CST): Martínez Escobedo MD on 4/03/2025 at 8:37 AM     Finalized by (CST): Martínez Escobedo MD on 4/03/2025 at 8:43 AM          CT SPINE CERVICAL (CPT=72125)    Result Date: 4/2/2025  CONCLUSION:   1. No acute fracture or traumatic listhesis of the cervical spine. 2. Multilevel degenerative changes of the cervical spine, which are described above. 3. Reversal the cervical lordosis.  4. Mild dextrocurvature of the cervical spine.    Dictated by (CST): Rashid Hernandez MD on 4/02/2025 at 2:43 PM     Finalized by (CST): Rashid Hernandez MD on 4/02/2025 at 2:49 PM          CT SPINE LUMBAR (CPT=72131)    Result Date: 4/2/2025  CONCLUSION:   1. Small cortical irregularity involving the right transverse process of L2, which is concerning for an age indeterminate nondisplaced fracture. 2. Chronic cortical irregularity involving the right transverse process of L1, which may be secondary to congenital nonunion or sequela of prior trauma. 3. Lumbar vertebral body heights are maintained 4. Mild widening of the interspinous space at L2-L3, which may be secondary to ligamentous injury and/or interspinous bursitis.  5. Multilevel degenerative changes of the lumbar spine, which are described in detail above. 6. At L3-L4, there is severe canal stenosis, severe right greater than left subarticular zone stenosis, and moderate bilateral foraminal narrowing. 7. At L5-S1, there is mild canal stenosis, severe right foraminal narrowing, and moderate to severe left foraminal narrowing. 8. Mild dextrocurvature of the lumbar spine. 9. Mild subcutaneous edema overlying the spinous processes at L1-L3, which may be posttraumatic. 10. Prominence of the common bile duct measuring up to 10 mm, which is favored to be postoperative in the setting of cholecystectomy as well as age-related but consider correlation with liver function tests.  11. Lesser incidental findings described  above.     Dictated by (CST): Rashid Hernandez MD on 4/02/2025 at 12:40 PM     Finalized by (CST): Rashid Hernandez MD on 4/02/2025 at 12:56 PM          CT BRAIN OR HEAD (CPT=70450)    Result Date: 4/2/2025  CONCLUSION:  Subacute to remote appearing subdural hemorrhages along the bilateral superior frontal lobes.  Mild mass effect upon the bilateral frontal lobes.  No midline shift.  Mild chronic microvascular ischemic disease.  This report was communicated by telephone to Dr. Tim on 4/2/2025 at 1249 hours.     Dictated by (CST): Martínez Escobedo MD on 4/02/2025 at 12:44 PM     Finalized by (CST): Martínez Escobedo MD on 4/02/2025 at 12:49 PM           Assessment/Plan:  Principal Problem:    Acute subdural hematoma (HCC)  Active Problems:    Chronic subdural hematoma (HCC)    Subdural hemorrhage following injury, no loss of consciousness (HCC)    Unspecified fracture of second lumbar vertebra, initial encounter for closed fracture (HCC)    Essential hypertension    Unspecified fall, initial encounter    Long-term use of aspirin therapy      Arely Yadav is a(n) 89 year old female with bilateral subacute appearing frontal SDH while taking aspirin.  Repeat CT head this morning is stable.  No acute neurosurgical intervention at this time  Hold aspirin for 2 weeks.  No use of anti-inflammatories.  Patient will need to follow-up in the neurosurgical office with a repeat CT head in 2 weeks for surveillance of blood products  Medical management per the hospitalist team    All questions and concerns were addressed. We appreciate the opportunity to participate in the care of this patient. Please do not hesitate to call our office ([phone]) with any issues.    Troy Lopes PA-C  4/3/2025  10:16 AM

## 2025-04-03 NOTE — CONSULTS
[facility]  part of [facility]  Palliative Care Initial Consult Note    Arely Yadav Patient Status:  Inpatient    10/27/1935 MRN [id number]   Location [facility] 5SW/SE Attending Trevor Gonzalez, DO   Hosp Day # 1 PCP WENDI SUE MD     Date of Consult: 4/3/2025  Patient seen at: [facility] In[facility]    The  Cures Act makes medical notes like these available to patients in the interest of transparency. Please be advised this is a medical document. Medical documents are intended to carry relevant information, facts as evident, and the clinical opinion of the practitioner. The medical note is intended as peer to peer communication and may appear blunt or direct. It is written in medical language and may contain abbreviations or verbiage that are unfamiliar.     Reason for Consultation:   for evaluation of Palliative Care needs and Establish palliative care;Goals of care discussion.    Subjective     History of Present Illness: Arely Yadav is a 89 year old female with history of dementia, CVA, HTN and glaucoma who was admitted on 2025 for s/p fall out of w/c and L arm/back pain. See below for reviewed labs and imaging. Pt was admitted for treatment and evaluation of bilateral SDH, weakness, and fall. See below for reviewed imaging.     She is being followed by neurosurgery services. No surgical intervention recommended.    I reviewed labs and imaging-see below. History was obtained from [facility] and pt's dtr Arely as pt is a poor historian and confused.  Today is day 1 of hospitalization. Pt had recent ER visit 3/26 for L sided arm/CP.     Pt has no code status listed in EPIC and no advance directives on file.     Reviewed symptom needs past 24 hrs: Seroquel 25 mg po X1 last night, Morphine 2 mg IVP X1 yesterday    Patient was seen and examined in the chair with dtr Arely at bedside. Pt is alert and minimally verbal. She is [place] speaking only and  dtr tells me she is very confused and does not recognize family at times. Pt gets easily agitated with any stimulation and PT had difficulty working with her today. Breathing appears non-labored on RA. No signs of pain. Dtr told me about incidental L breast mass she felt recently and I palpated L lateral breast which does have firm, nodule noted. She has had decreased appetite recently, +wt loss of 19 pounds in past 5 months per EPIC documentation and moved her bowels yesterday. See physical exam and ROS below.    Review of Systems/Palliative Care symptom needs assessed:   Unable to obtain ROS due to pt factors above    Medical History: obtained from [facility]  Past Medical History:    Dementia (HCC)    Essential hypertension    Glaucoma    Stroke (HCC)     No past surgical history on file.    Family History: obtained from [facility]  Family History   Problem Relation Age of Onset    Uterine Cancer Sister     Breast Cancer 2nd occurrence Sister     Pancreatic Cancer Other     Other (cirrhosis) Other        Palliative Care Social History:   Marital Status:   Children: 2 living kids dtr Arely/son Kirby, 2 other sons   Living Situation Prior to Admit: lives with dtr Arely  Occupational History: Retired, worked for [employer].    Substance History:   reports that she has never smoked. She has never used smokeless tobacco.  reports no history of alcohol use.  reports no history of drug use.  Hx of Substance Use/Abuse: No    Spiritual Assessment:   Gnosticist: Deferred    Allergies:  Allergies[1]    Medications:     Current Facility-Administered Medications:     acetaminophen (Tylenol Extra Strength) tab 500 mg, 500 mg, Oral, Q4H PRN    ondansetron (Zofran) 4 MG/2ML injection 4 mg, 4 mg, Intravenous, Q6H PRN    QUEtiapine (SEROquel) tab 25 mg, 25 mg, Oral, Nightly PRN    melatonin cap/tab 5 mg, 5 mg, Oral, Nightly    Nutritional status:  BMI: 20 Weight: 112  Weight loss: down 19 pounds in past 5  months  Current Appetite: Poor  Dysphagia: dtr denies, but she does pocket food at times    Functional Status History:  ADLs: mostly sitting and needs help with ADL's, not ambulating much anymore per dtr d/t weakness  Recent Falls: Yes    Palliative Performance Scale:   Prior to admission: 40%  Observed during hospitalization: 40%  % Ambulation Activity Level Self-Care Intake Consciousness   100 Full  Normal  No Disease Full Normal Full   90 Full  Normal  Some Disease Full Normal Full   80 Full  Normal w/effort  Some Disease Full Normal or reduced Full   70 Reduced  Can't Perform Job  Some Disease Full Normal or reduced Full   60 Reduced  Can't Perform Hobby   Significant Disease Occ Assist Normal or reduced Full or confused   50 Mainly sit/lie Can't do any work  Extensive Disease Partial Assist Normal or reduced Full or confused   40 Mainly in bed Can't do any work  Extensive Disease Mainly Assist Normal or reduced Full or confused   30 Bed Bound Can't do any work  Extensive Disease Max Assist  Total Care Reduced  Drowsy/confused   20 Bed Bound Can't do any work  Extensive Disease Max Assist  Total Care Minimal  Drowsy/confused   10 Bed Bound Can't do any work  Extensive Disease Max Assist  Total Care Mouth Care  Drowsy/confused   0 Death        Objective      Vital Signs:  Blood pressure 127/50, pulse 72, temperature 98.2 °F (36.8 °C), temperature source Axillary, resp. rate 16, weight 112 lb 7 oz (51 kg), SpO2 98%.  Body mass index is 20.56 kg/m².  Present Level of pain: MARY ANN  Non-verbal signs of pain present: No    Physical Exam:  General: Alert and in no apparent distress. Weak, frail/thin appearing  HEENT: No focal deficits.  Cardiac: Regular rate and rhythm, S1, S2 normal, no murmur, rub or gallop.  Breast: L firm, fixed breast nodule noted  Lungs: Clear without wheezes, rales, rhonchi or dullness.  Normal excursions and effort.  Abdomen: Soft, non-tender, normal bowel sounds X 4 quadrants, no rebound or  guarding  Extremities: Without clubbing, cyanosis. Peripheral pulses are 2+. BLE Edema not present  Neurologic: Alert and minimally verbal, easily agitated with stimulation.  Skin: Warm and dry.    Hematology:  Lab Results   Component Value Date    WBC 13.2 (H) 04/02/2025    HGB 11.7 (L) 04/02/2025    HCT 34.4 (L) 04/02/2025    .0 04/02/2025       Coags:No results found for: \"PT\", \"INR\", \"PTT\"    Chemistry:  Lab Results   Component Value Date    CREATSERUM 1.01 04/02/2025    BUN 14 04/02/2025     04/02/2025    K 3.8 04/03/2025     04/02/2025    CO2 29.0 04/02/2025     (H) 04/02/2025    CA 9.6 04/02/2025    ALB 3.8 03/26/2025    ALKPHO 62 03/26/2025    BILT 0.2 03/26/2025    TP 6.5 03/26/2025    AST 18 03/26/2025    ALT <7 (L) 03/26/2025       Imaging:  CT BRAIN OR HEAD (CPT=70450)    Result Date: 4/3/2025  CONCLUSION:   Chronic appearing bilateral subdural hemorrhages are unchanged since 4/2/2025.  No acute hemorrhage or midline shift.  Bilateral parenchymal calcifications are nonspecific but can be seen with remote inflammation or remote infection such as neurocysticercosis which is unchanged.  Mild chronic microvascular ischemic disease.     Dictated by (CST): Martínez Escobedo MD on 4/03/2025 at 8:37 AM     Finalized by (CST): Martínez Escobedo MD on 4/03/2025 at 8:43 AM          CT SPINE CERVICAL (CPT=72125)    Result Date: 4/2/2025  CONCLUSION:   1. No acute fracture or traumatic listhesis of the cervical spine. 2. Multilevel degenerative changes of the cervical spine, which are described above. 3. Reversal the cervical lordosis.  4. Mild dextrocurvature of the cervical spine.    Dictated by (CST): Rashid Hernandez MD on 4/02/2025 at 2:43 PM     Finalized by (CST): Rashid Hernandez MD on 4/02/2025 at 2:49 PM          CT SPINE LUMBAR (CPT=72131)    Result Date: 4/2/2025  CONCLUSION:   1. Small cortical irregularity involving the right transverse process of L2, which is concerning for an age  indeterminate nondisplaced fracture. 2. Chronic cortical irregularity involving the right transverse process of L1, which may be secondary to congenital nonunion or sequela of prior trauma. 3. Lumbar vertebral body heights are maintained 4. Mild widening of the interspinous space at L2-L3, which may be secondary to ligamentous injury and/or interspinous bursitis.  5. Multilevel degenerative changes of the lumbar spine, which are described in detail above. 6. At L3-L4, there is severe canal stenosis, severe right greater than left subarticular zone stenosis, and moderate bilateral foraminal narrowing. 7. At L5-S1, there is mild canal stenosis, severe right foraminal narrowing, and moderate to severe left foraminal narrowing. 8. Mild dextrocurvature of the lumbar spine. 9. Mild subcutaneous edema overlying the spinous processes at L1-L3, which may be posttraumatic. 10. Prominence of the common bile duct measuring up to 10 mm, which is favored to be postoperative in the setting of cholecystectomy as well as age-related but consider correlation with liver function tests.  11. Lesser incidental findings described above.     Dictated by (CST): Rashid Hernandez MD on 4/02/2025 at 12:40 PM     Finalized by (CST): Rashid Hernandez MD on 4/02/2025 at 12:56 PM          CT BRAIN OR HEAD (CPT=70450)    Result Date: 4/2/2025  CONCLUSION:  Subacute to remote appearing subdural hemorrhages along the bilateral superior frontal lobes.  Mild mass effect upon the bilateral frontal lobes.  No midline shift.  Mild chronic microvascular ischemic disease.  This report was communicated by telephone to Dr. Tim on 4/2/2025 at 1249 hours.     Dictated by (CST): Martínez Escobedo MD on 4/02/2025 at 12:44 PM     Finalized by (CST): Martínez Escobedo MD on 4/02/2025 at 12:49 PM                   Summary of GOC Discussion        I discussed reason for palliative care consultation with patient's dtr Arely.     I differentiated the palliative  treatment-focus model versus the hospice comfort-focused philosophy of care. I informed the patient/family that having palliative care support does not limit medical treatment options or decisions to those who wish to continue curative or restorative medical therapies. I discussed the benefits of palliative care to include assistance with arising symptom management needs, an extra layer of support, to ensure GOC are respected throughout healthcare continuum, and assist with transition to hospice care when appropriate.  Palliative care handout provided.    Outpatient/Community Palliative Care Services:  Usually visit once per 4 weeks depending on contracted agency guidelines  Focus on GOC and symptom management   Palliative Care criteria:  Not altered by prognosis   Does not limit curative or restorative therapies      Outpatient Hospice services:  24/7 phone triage services   RN visit one or more times per week depending on need  Home health aid to assist in ADLs/hygiene   Hospice criteria:  Less than six-month prognosis   Must forego most life-prolonging  measures/treatments   Focus solely on comfort   Must sign onto hospice benefit with agency     Background provided by family:  -Dtr Arely talked with me about her mother's recent decline at home with increased weakness, falls, minimally verbal and not remembering family, decreased appetite with pocketing food and wt loss, and resistance to care with agitation at times. Dtr says she has been pt's primary CG at home and her brother comes to stay with pt when she is at work. She says she took pt all of her medications after last years hospitalization as she was too groggy and sleeping all the time.     Prognostic awareness/understanding: Still in informational gathering stage    -I  discussed current clinical condition and explored previous discussions with MDs.    -I discussed the normal disease trajectory of advanced dementia with associated symptoms and  decline over time.     Hopes/goals/concerns:   -Arely spent time talking with me about her concern with pt's recent decline. She is primary CG support for pt at home and her brother supplements when she is working. Arely tells me she doesn't want to put her in NH or memory care and prefers her to return home on dc. We talked about her dementia making cooperating with PT difficult and how TANISHA would likely be challenging.    -I talked with dtr about options for returning home with  and community palliative care vs consideration for home hospice care. Provided basic overview of Medicare hospice benefit and philosophy. I also encouraged consideration for increasing CG support at home and she would also like to talk with SW. Arely is agreeable to talking with [facility] for informational session. She talked with me about her past experience with hospice for her father.    -Dr. Gonzalez joined at the end of my visit and we also talked about pt's incidental finding  L breast mass. MD discussed mammogram/US/bx would be required for dx which Arely would not want to pursue any cancer tx, but she says knowing that it is possibly a cancer would help her with decisions. Recommended not pursuing further w/u given advanced dementia and poor PS which dtr understands. MD discussed these tests would need to be done as OP.     -Arely wants to talk with her brother about today's visit as well. Will continue with supportive care. Discussed ongoing GOC discussions will be needed. Agreeable to palliative care following.     Advance Care Planning counseling and discussion:     -I discussed the importance of advance care planning prior to crisis with pt's dtr Arely. She tells me she is pt's HCPOA.    -I addressed pt's full code status discussed the risks vs benefits of life sustaining treatments in the setting of advancing age and in her clinical picture. Education provided on what DNAR entails and encouraged  setting limits. Arely was very tearful as we talked about this and I provided support.     -Arely expressed wishes for DNAR, DNI, no feeding tubes and  continue supportive medical care. I reviewed POLST form in detail and encouraged completion once all decisions are made after hospice meeting.     -Provided emotional support to pt's dtr who is coping adequately.     Procedures:  No intubation  No g-tube    Assessment and Recommendations      Problem List:    SDH  Falls  Advanced dementia with behavior disturbance  L breast mass concerning for malignancy  Leukocytosis  Weakness  HTN  H/o CVA  Glaucoma  Malnutrition    Goals of care counseling  -see above for details  -Confirmed wishes for DNAR/DNI-selective and continue supportive care.  -Discussed concern with incidental finding of L breast mass concerning for malignancy and did not recommend aggressive w/u at this point which dtr will discuss further with her brother. She understands she would not be a good candidate for cancer tx.  -[facility] to provide informational session for possible home hospice care.  -Ongoing GOC discussions will be needed through clinical course and over time.   -Agreeable to palliative care following  -Dispo:  Prefers to return home on dc with possible hospice care vs HH/CPC pending meeting outcome. Encouraged consideration for CG support. SW to help with dc planning.   -Provided emotional support to pt/family who are coping adequately.    Advance care planning  -see above for details  -Pt's dtr Arely Escobar says she is [facility] #[phone].  -Provided copy of [place] POLST form for review and will plan to complete tomorrow once all decisions are made.    Palliative Performance Scale 40%    Emotion support provided to patient/family today: Yes    A total of 90 mins were spent on this consult, which included all of the following:direct face to face contact, history taking, physical examination, and >50% was spent counseling  and coordinating care.    Discussed today's visit with Dr. Gonzalez, Mariela [facility] hospice and RN.    I will continue to follow clinically.      Thank you for allowing Palliative Care services to participate in the care of Ms. Arely Yadav.       Shruti Mzaariegos, ANP-BC, [facility] [id number]  4/3/2025  1:43 PM  Palliative Care Services          [1] No Known Allergies

## 2025-04-03 NOTE — PAYOR COMM NOTE
--------------  ADMISSION REVIEW     Payor: SOFÍA ALVAREZ O  Subscriber #:  [id number]  Authorization Number: [id number]    Admit date: 4/2/25  Admit time:  5:07 PM       REVIEW DOCUMENTATION:     ED Provider Notes        ED Provider Notes signed by Sachin Tim MD at 4/2/2025  1:45 PM            Patient Seen in: [facility] Emergency Department      History     Chief Complaint   Patient presents with    Fall     Stated Complaint: fall x 2 w/back, left arm pain, vomiting    Subjective:   HPI      89-year-old cared for by her daughter primarily at home with dementia with 2 falls today.  Daughter woke up this morning and found her outside the wheelchair she been on the floor if she thinks for several hours.  She was complaining of some mild low back pain and left arm pain.  The left arm pain is not new and she was actually seen for cardiac workup a few weeks ago here.  She did vomit once.  Daughter did get her back in the wheelchair but upon proceeding to leave her alone she attempted to get back in bed and the patient fell again.      Physical Exam     ED Triage Vitals [04/02/25 1032]   /71   Pulse 90   Resp 18   Temp 98.2 °F (36.8 °C)   Temp src Oral   SpO2 98 %   O2 Device None (Room air)       Current Vitals:   Vital Signs  BP: 132/71  Pulse: 90  Resp: 18  Temp: 98.2 °F (36.8 °C)  Temp src: Oral    Oxygen Therapy  SpO2: 98 %  O2 Device: None (Room air)        Physical Exam  Constitutional:   Appears well-developed. No distress.   Head: Normocephalic and atraumatic.   Eyes: Conjunctivae are normal. Pupils are equal, round, and reactive to light.   Neck: Normal range of motion. Neck supple. No pain posteriorly or gross signs of trauma.  Cardiovascular: Normal rate, regular rhythm and intact distal pulses.    Pulmonary/Chest: Effort normal. No respiratory distress.   Abdominal: Soft. There is no tenderness. There is no guarding.   Musculoskeletal:  pain with palpation of the left humeral region but  not in the shoulder or elbow.  No swelling or deformity noted.  Radial pulse strong.  She does have pain in the upper to mid lumbar spine region with a small overlying superficial stage I pressure ulcer.  No crepitus.  Neurological: Awake and alert, at baseline per daughter.  No gross focal deficits  Skin: Skin is warm and dry.   Psychiatric: Normal mood and affect.  Behavior is normal.   Nursing note and vitals reviewed.    Differential diagnosis includes recurrent falls, head injury, L-spine fracture, UTI.      ED Course     Labs Reviewed   CBC WITH DIFFERENTIAL WITH PLATELET - Abnormal; Notable for the following components:       Result Value    WBC 13.2 (*)     RBC 3.47 (*)     HGB 11.7 (*)     HCT 34.4 (*)     RDW-SD 47.1 (*)     Neutrophil Absolute Prelim 11.73 (*)     Neutrophil Absolute 11.73 (*)     Lymphocyte Absolute 0.55 (*)     All other components within normal limits   BASIC METABOLIC PANEL (8) - Abnormal; Notable for the following components:    Glucose 114 (*)     Potassium 3.4 (*)     eGFR-Cr 54 (*)     All other components within normal limits   URINALYSIS WITH CULTURE REFLEX - Abnormal; Notable for the following components:    Glucose Urine 30 (*)     Ketones Urine Trace (*)     Blood Urine Trace (*)     Protein Urine Trace (*)     Leukocyte Esterase Urine 500 (*)     WBC Urine 6-10 (*)     RBC Urine 3-5 (*)     Squamous Epi. Cells Few (*)     Hyaline Casts Present (*)     All other components within normal limits   TROPONIN I HIGH SENSITIVITY - Normal   RAINBOW DRAW LAVENDER   RAINBOW DRAW LIGHT GREEN   RAINBOW DRAW BLUE   URINE CULTURE, ROUTINE     EKG    Rate, intervals and axes as noted on EKG Report.  Rate: 83  Rhythm: Sinus Rhythm  Reading: no acute ischemic changes      CT SPINE LUMBAR (CPT=72131)    Result Date: 4/2/2025  PROCEDURE: CT SPINE LUMBAR (CPT=72131)  COMPARISON: None.  INDICATIONS: fall x 2 w/back, left arm pain, vomiting  TECHNIQUE:   Multi-planar CT images were obtained  without intravenous contrast material.  Automated exposure control for dose reduction was used. Adjustment of the mA and/or kV was done based on the patient's size. Use of iterative reconstruction technique for dose reduction was used.  Dose information is transmitted to the ACR (American College of Radiology) NRDR (National Radiology Data Registry) which includes the Dose Index Registry.   FINDINGS:  PARASPINAL AREA: There is subcutaneous edema involving the posterior subcutaneous soft tissues overlying the spinous processes at L1-L3 (2:29). BONES:   There is a small cortical irregularity involving the right transverse process of L2 (3:31, 8:50).  There is a chronic irregularity involving the right transverse process of L1 (3:117, 8:50).  The vertebral body heights are maintained.  No aggressive osseous lesion.  Moderate sacroiliac osteoarthrosis. ALIGNMENT:   Mild dextrocurvature of the lumbar spine.  The lumbar lordosis is maintained.  No significant listhesis.  Mild widening of the interspinous space at L2-L3 (6:42).  LUMBAR DISC LEVELS:  There are multilevel advanced degenerative changes of the lumbar spine.  There is multilevel disc height loss, which is most advanced and severe at L3-L4 and L5-S1.  There is multilevel disc ex vacuo phenomena, which is most prominent at L2-L3.  There are multilevel small Schmorl's nodes.  There are multilevel degenerative endplate changes.  There are multilevel ventral disc osteophyte complexes.  There are multilevel posterior disc osteophyte complexes and ligamentum flavum hypertrophy.  The dominant  posterior disc osteophyte complexes at L3-L4 and likely has a superimposed central/right paracentral/foraminal protrusion that results in severe canal stenosis, severe right subarticular zone stenosis, and moderate bilateral foraminal narrowing (6:45).  There is additional multilevel canal stenosis elsewhere, which is moderate at L1-L2 (11:15, 6:39), mild-to-moderate at L2-L3,  moderate at L3-L4, and mild at L5-S1.  There is multilevel facet arthropathy.  The facet arthropathy as well as right predominant marginal osteophytes result in severe right and moderate to severe left foraminal narrowing at L5-S1.  is additional moderate left greater than right foraminal narrowing at L4-L5.   OTHER: The gallbladder surgically absent.  There is prominence of the common bile duct measuring up to 10 mm (5:15).  There is a partially visualized right renal cyst.  The visualized loops are not dilated.  There is colonic diverticulosis.  There is atherosclerotic calcification of the abdominal aorta and its branching vessels.         CONCLUSION:   1. Small cortical irregularity involving the right transverse process of L2, which is concerning for an age indeterminate nondisplaced fracture. 2. Chronic cortical irregularity involving the right transverse process of L1, which may be secondary to congenital nonunion or sequela of prior trauma. 3. Lumbar vertebral body heights are maintained 4. Mild widening of the interspinous space at L2-L3, which may be secondary to ligamentous injury and/or interspinous bursitis.  5. Multilevel degenerative changes of the lumbar spine, which are described in detail above. 6. At L3-L4, there is severe canal stenosis, severe right greater than left subarticular zone stenosis, and moderate bilateral foraminal narrowing. 7. At L5-S1, there is mild canal stenosis, severe right foraminal narrowing, and moderate to severe left foraminal narrowing. 8. Mild dextrocurvature of the lumbar spine. 9. Mild subcutaneous edema overlying the spinous processes at L1-L3, which may be posttraumatic. 10. Prominence of the common bile duct measuring up to 10 mm, which is favored to be postoperative in the setting of cholecystectomy as well as age-related but consider correlation with liver function tests.  11. Lesser incidental findings described above.     Dictated by (CST): Rashid Hernandez MD  on 4/02/2025 at 12:40 PM     Finalized by (CST): Rashid Hernandez MD on 4/02/2025 at 12:56 PM          CT BRAIN OR HEAD (CPT=70450)    Result Date: 4/2/2025  PROCEDURE: CT BRAIN OR HEAD (CPT=70450)  COMPARISON: [facility], MRI BRAIN WWO CONTRAST, 1/13/2007, 1:38 PM.  INDICATIONS: fall x 2 w/back, left arm pain, vomiting  TECHNIQUE: CT images were obtained without contrast material.  Automated exposure control for dose reduction was used.  Dose information is transmitted to the ACR (American College of Radiology) NRDR (National Radiology Data Registry) which includes the Dose Index Registry.  FINDINGS:  PARENCHYMA:  Diffuse low attenuation is seen within the bilateral periventricular white matter compatible with chronic microvascular ischemic disease.  Along the superior aspects of the bilateral frontal lobes, there is extra-axial fluid and soft tissue which measures up to 0.8 cm along the right superior frontal lobe and 0.8 cm along the superior left frontal lobe.  This measures 15 Hounsfield units on the left and 22 Hounsfield units on the right.  There is mild sulcal effacement of the bilateral frontal lobes.  No midline shift.  CSF SPACES: There is mild generalized parenchymal volume loss with concordant enlargement of the ventricular system. No hydrocephalus.  SINUSES: There is mild mucosal thickening of the ethmoid air cells. Remainder of the paranasal sinuses are intact. Mastoid air cells are well aerated.  ORBITS:                 Visualized orbits are unremarkable.  CALVARIUM:     No acute osseous abnormality.  OTHER:                There is atherosclerotic calcification of the intracranial vasculature.           CONCLUSION:  Subacute to remote appearing subdural hemorrhages along the bilateral superior frontal lobes.  Mild mass effect upon the bilateral frontal lobes.  No midline shift.  Mild chronic microvascular ischemic disease.  This report was communicated by telephone to Dr. Tim on  4/2/2025 at 1249 hours.     Dictated by (CST): Martínez Escobedo MD on 4/02/2025 at 12:44 PM     Finalized by (CST): Martínez Escobedo MD on 4/02/2025 at 12:49 PM          XR HUMERUS (MIN 2 VIEWS), LEFT (CPT=73060)    Result Date: 4/2/2025  PROCEDURE: XR HUMERUS (MIN 2 VIEWS), LEFT (CPT=73060)  COMPARISON: [facility], XR CHEST AP PORTABLE (CPT=71045), 3/26/2025, 6:27 PM.  INDICATIONS: Fall x 2 w/back, left arm pain, with vomiting.  Findings and impression:  Normal alignment with no fracture Finalized by (CST): Travis Malagon MD on 4/02/2025 at 11:55 AM          XR CHEST AP PORTABLE  (CPT=71045)    Result Date: 3/26/2025  PROCEDURE: XR CHEST AP PORTABLE  (CPT=71045) TIME: 6:29 p.m.   COMPARISON: None.  INDICATIONS: Chest pain and left arm tingling  TECHNIQUE:   Single view.   FINDINGS:  CARDIAC/VASC: No cardiac silhouette abnormality or cardiomegaly.  Unremarkable pulmonary vasculature.  MEDIAST/PAVEL:   Atherosclerotic aorta with no visible aneurysm.  LUNGS/PLEURA: No significant pulmonary parenchymal abnormalities.  No effusion or pleural thickening. BONES: Scattered mild degenerative endplate changes in the visualized thoracolumbar spine.  Cephalad migration of both humeral heads with narrowing the subacromial spaces compatible with chronic rotator cuff tears. OTHER: Negative.          CONCLUSION: No acute cardiopulmonary abnormality.   Dictated by (CST): Geovanny Agarwal MD on 3/26/2025 at 6:35 PM     Finalized by (CST): Geovanny Agarwal MD on 3/26/2025 at 6:36 PM               [facility]          Admission disposition: 4/2/2025  1:43 PM           Medical Decision Making  Patient is stable.  CT results as noted.  Reviewed by neurosurgery Dr. Rushing, he feels these are most likely chronic.  Family has been giving her aspirin daily for the last week or 2 for this left arm pain.  Obviously we will hold aspirin.  No indication for transfusion per Dr. Rushing.  They will see the patient and I ordered a repeat head CT for tomorrow.   The patient will be admitted by the hospitalist.  Dr. Rushing is comfortable the patient going to the floor today.    Problems Addressed:  Chronic subdural hematoma (HCC): chronic illness or injury with exacerbation, progression, or side effects of treatment    Amount and/or Complexity of Data Reviewed  External Data Reviewed: ECG.     Details: 3/26/25 EKG w/ similar changes when compared to today's EKG  Labs: ordered. Decision-making details documented in ED Course.  Radiology: ordered and independent interpretation performed. Decision-making details documented in ED Course.     Details: By my review of the noncontrast head CT there appears to be evidence of possible mass effect or fluid collection/hemorrhage in the bilateral frontal regions  ECG/medicine tests: ordered and independent interpretation performed. Decision-making details documented in ED Course.    Risk  Parenteral controlled substances.  Decision regarding hospitalization.    Critical Care  Total time providing critical care: minutes (I spent a total of 35 minutes of critical care time in obtaining history, performing a physical exam, bedside monitoring of interventions, collecting and interpreting tests and discussion with consultants but not including time spent performing procedures.)        Disposition and Plan     Clinical Impression:  1. Acute subdural hematoma (HCC)    2. Chronic subdural hematoma (HCC)         Disposition:  Admit  4/2/2025  1:43 pm     Hospital Problems       Present on Admission  Date Reviewed: 11/4/2024            ICD-10-CM Noted POA    * (Principal) Acute subdural hematoma (HCC) S06.5XAA 4/2/2025 Unknown              Signed by Sachin Tim MD on 4/2/2025  1:45 PM         History and Physical    H&P signed by Trevor Gonzalez DO at 4/2/2025  3:38 PM       [facility] Hospitalist H&P         CC: Fall      PCP: WENDI SUE MD     History of Present Illness:   This is a 89-year-old female with a history of  dementia, history of stroke in the past, hypertension, who presents to the hospital for falls, weakness, left arm pain.  History obtained by daughter and son at bedside.  The patient apparently was seen from their camera at the edge of the bed, and slowly slid off the bed around 4 AM today.  She was having some left arm pain the last few days so they were giving her some aspirin daily.  There is no witness fall to her head however.  At baseline the patient is mainly wheelchair-bound.  She does get up sometimes and transfers to a walker.  The only time she usually does this herself is through get onto the commode.  Otherwise she requires assistance.  She is normally sitting most of the day.  In terms of food intake, she eats very soft foods or liquids.  No recent aspiration events.  She does urinate in the commode as well as bowel movements.  In terms of her speech, she is still able to communicate but is forgetful.  The patient is not currently taking any medications.     Diagnostic Data:    CBC/Chem       Recent Labs   Lab 03/26/25 1844 04/02/25  1111   WBC 7.4 13.2*   HGB 11.1* 11.7*   .0 99.1   .0 285.0              Recent Labs   Lab 03/26/25 1844 04/02/25  1111    137   K 4.2 3.4*    100   CO2 29.0 29.0   BUN 12 14   CREATSERUM 0.91 1.01   * 114*   CA 9.3 9.6             Recent Labs   Lab 03/26/25 1844   ALT <7*   AST 18   ALB 3.8         No results for input(s): \"TROP\" in the last 168 hours.        Radiology: CT SPINE LUMBAR (CPT=72131)     Result Date: 4/2/2025  CONCLUSION:          1. Small cortical irregularity involving the right transverse process of L2, which is concerning for an age indeterminate nondisplaced fracture. 2. Chronic cortical irregularity involving the right transverse process of L1, which may be secondary to congenital nonunion or sequela of prior trauma. 3. Lumbar vertebral body heights are maintained 4. Mild widening of the interspinous space at L2-L3,  which may be secondary to ligamentous injury and/or interspinous bursitis.  5. Multilevel degenerative changes of the lumbar spine, which are described in detail above. 6. At L3-L4, there is severe canal stenosis, severe right greater than left subarticular zone stenosis, and moderate bilateral foraminal narrowing. 7. At L5-S1, there is mild canal stenosis, severe right foraminal narrowing, and moderate to severe left foraminal narrowing. 8. Mild dextrocurvature of the lumbar spine. 9. Mild subcutaneous edema overlying the spinous processes at L1-L3, which may be posttraumatic. 10. Prominence of the common bile duct measuring up to 10 mm, which is favored to be postoperative in the setting of cholecystectomy as well as age-related but consider correlation with liver function tests.  11. Lesser incidental findings described above.     Dictated by (CST): Rashid Hernandez MD on 4/02/2025 at 12:40 PM     Finalized by (CST): Rashid Hernandez MD on 4/02/2025 at 12:56 PM           CT BRAIN OR HEAD (CPT=70450)     Result Date: 4/2/2025  CONCLUSION:  Subacute to remote appearing subdural hemorrhages along the bilateral superior frontal lobes.  Mild mass effect upon the bilateral frontal lobes.  No midline shift.  Mild chronic microvascular ischemic disease.  This report was communicated by telephone to Dr. Tim on 4/2/2025 at 1249 hours.     Dictated by (CST): Martínez Escobedo MD on 4/02/2025 at 12:44 PM     Finalized by (CST): Martínez Escobedo MD on 4/02/2025 at 12:49 PM               ASSESSMENT / PLAN:   This is a 89-year-old female with a history of dementia, history of stroke in the past, hypertension, who presents to the hospital for falls, weakness, left arm pain.      Falls  Generalized weakness and deconditioning most likely  Bilateral frontal lobe subdural hemorrhage, suspect subacute to chronic, midline shift  -family  did not see her fall and hit her head.   -suspect brain findings are not acute  -neurologically her  exam is intact and stable   -plan to repeat CT head in AM, neurosurgery was consulted.   -obtain swallow eval as high risk of aspiration   -hold nsaids or blood thinners      Severe dementia, likely Alzheimers   -She currently does not take any medications  -Suspect slow and progressive decline in her functional status, memory  -I did discuss at length with the son and daughter about the normal progression of dementia  -I do believe they will benefit from palliative care consult to further educate on resources available, options if symptoms occur, and eventual transition to hospice when the time comes.  Code status was attempted by myself however the daughter really felt like she needed some time to think about this, however I do suspect she will make her mother DNR.  At this time in the chart I have to place full code however but I will continue to address.   -Brought up the idea about eventually her having worsening nutrition, the idea of G-tube feeding, versus pleasure feeding, or both.      Fluids: low rate of saline   Diet: easy to eat/soft foods, swallow eval   DVT prophylaxis: hold chemical prophy  Code status: full for now but will continue to address      Trevor Gonzalez DO  [facility] Hospitalist             Signed by Trevor Gonzalez DO on 4/2/2025  3:38 PM               CONSULT  ASSESSMENT:     89 y/o female with history of dementia who presents after fall, recent aspirin use x 2 weeks, found to have small bilateral frontal SDH which appears to be subacute to chronic.  She is AxOx1 at baseline without focal signs on examination.  CT lumbar without acute findings.  CT cervical spine demonstrates multilevel degeneration without any acute fractures or dislocation.      Plan:     1.  No role for acute neurosurgical intervention  2.  Recommend hospital admission, repeat head CT tomorrow morning  3.  Hold antiplatelet, anticoagulant agents  4.  Cardene drip as needed to maintain SBP < 140  5.  Medical  management per hospitalist        Patient seen and examined. Patient agrees with the plan and all questions were answered. Discussed with Dr. Dotson and Dr. Mabry.         Keshia Neri M.S., REJI  [facility]    4/2/2025 3:57 PM             Attestation signed by Jamison Mabry MD at 4/3/2025  6:29 AM     I interviewed and examined Ms. Arely Yadav and independently reviewed her imaging studies. I reviewed her clinical information with Ms. Keshia Neri PA-C and agree with her history and physical examination findings, assessment, and plan as detailed above, with any exceptions detailed below.     Patient seen and examined evening of 4/2/2025. Arely Yadav is an 89-year-old female with dementia who presented after multiple falls from her wheelchair. She was found to have small bilateral frontal subacute to chronic subdural hemorrhages measuring up to 0.8 cm with mild mass effect but no midline shift, likely exacerbated by recent aspirin use. She is at baseline for her dementia and shows no focal deficits apart from mild confusion. Imaging of her cervical and lumbar spine revealed multilevel degenerative changes and a possible nondisplaced fracture at L2 without acute need for surgical intervention. The plan includes admission for monitoring, repeat head CT tomorrow, strict blood pressure control (SBP < 140), and holding antiplatelet therapy. Hospitalist service assisting with her broader medical issues, including severe dementia and potential underlying causes of her left-sided arm pain. Coordination of care will continue with a multidisciplinary team approach, and family will be engaged in decision-making, particularly regarding goals of care and code status.     Jamison Mabry MD  Neurological Surgery     [facility]              4/3          Date of Service: 4/3/2025 10:16 AM     Signed            [facility]  part of  [facility]     Neurosurgery Progress Note        Subjective:  Arely Yadav is a(n) 89 year old female admitted to the hospital for bilateral frontal SDH while consuming aspirin.  Patient is resting comfortably this morning.  She is oriented x 1.     Vital Signs:    Temp:  [98.2 °F (36.8 °C)-98.5 °F (36.9 °C)] 98.2 °F (36.8 °C)  Pulse:  [55-92] 72  Resp:  [14-20] 16  BP: (108-141)/(47-71) 127/50  SpO2:  [95 %-99 %] 98 %     I/O:  I/O last 3 completed shifts:  In: 290 [P.O.:290]  Out: -      Inpatient Medications:     Current Facility-Administered Medications:     acetaminophen (Tylenol Extra Strength) tab 500 mg, 500 mg, Oral, Q4H PRN    ondansetron (Zofran) 4 MG/2ML injection 4 mg, 4 mg, Intravenous, Q6H PRN    QUEtiapine (SEROquel) tab 25 mg, 25 mg, Oral, Nightly PRN    melatonin cap/tab 5 mg, 5 mg, Oral, Nightly     Labs:        Lab Results   Component Value Date     WBC 13.2 (H) 04/02/2025     HGB 11.7 (L) 04/02/2025     .0 04/02/2025     BUN 14 04/02/2025      04/02/2025     K 3.8 04/03/2025     CO2 29.0 04/02/2025      (H) 04/02/2025     ALB 3.8 03/26/2025            Neurological Exam:  Sleepy, oriented x 1 to self  PERRL, EOMI  Face symmetric  Moving all extremities x 4 spontaneously to gravity     Abdomen:  Soft, non-distended, non-tender, with no rebound or guarding.  No peritoneal signs.   Extremities:  Non-tender, no lower extremity edema noted.          Imaging:  CT BRAIN OR HEAD (CPT=70450)     Result Date: 4/3/2025  CONCLUSION:          Chronic appearing bilateral subdural hemorrhages are unchanged since 4/2/2025.  No acute hemorrhage or midline shift.  Bilateral parenchymal calcifications are nonspecific but can be seen with remote inflammation or remote infection such as neurocysticercosis which is unchanged.  Mild chronic microvascular ischemic disease.     Dictated by (CST): Martínez Escobedo MD on 4/03/2025 at 8:37 AM     Finalized by (CST): Martínez Escobedo MD on 4/03/2025 at  8:43 AM           CT SPINE CERVICAL (CPT=72125)     Result Date: 4/2/2025  CONCLUSION:          1. No acute fracture or traumatic listhesis of the cervical spine. 2. Multilevel degenerative changes of the cervical spine, which are described above. 3. Reversal the cervical lordosis.  4. Mild dextrocurvature of the cervical spine.    Dictated by (CST): Rashid Hernandez MD on 4/02/2025 at 2:43 PM     Finalized by (CST): Rashid Hernandez MD on 4/02/2025 at 2:49 PM           CT SPINE LUMBAR (CPT=72131)     Result Date: 4/2/2025  CONCLUSION:          1. Small cortical irregularity involving the right transverse process of L2, which is concerning for an age indeterminate nondisplaced fracture. 2. Chronic cortical irregularity involving the right transverse process of L1, which may be secondary to congenital nonunion or sequela of prior trauma. 3. Lumbar vertebral body heights are maintained 4. Mild widening of the interspinous space at L2-L3, which may be secondary to ligamentous injury and/or interspinous bursitis.  5. Multilevel degenerative changes of the lumbar spine, which are described in detail above. 6. At L3-L4, there is severe canal stenosis, severe right greater than left subarticular zone stenosis, and moderate bilateral foraminal narrowing. 7. At L5-S1, there is mild canal stenosis, severe right foraminal narrowing, and moderate to severe left foraminal narrowing. 8. Mild dextrocurvature of the lumbar spine. 9. Mild subcutaneous edema overlying the spinous processes at L1-L3, which may be posttraumatic. 10. Prominence of the common bile duct measuring up to 10 mm, which is favored to be postoperative in the setting of cholecystectomy as well as age-related but consider correlation with liver function tests.  11. Lesser incidental findings described above.     Dictated by (CST): Rashid Hernandez MD on 4/02/2025 at 12:40 PM     Finalized by (CST): Rashid Hernandez MD on 4/02/2025 at 12:56 PM           CT BRAIN OR  HEAD (CPT=70450)     Result Date: 4/2/2025  CONCLUSION:  Subacute to remote appearing subdural hemorrhages along the bilateral superior frontal lobes.  Mild mass effect upon the bilateral frontal lobes.  No midline shift.  Mild chronic microvascular ischemic disease.  This report was communicated by telephone to Dr. Tim on 4/2/2025 at 1249 hours.     Dictated by (CST): Martínez Escobedo MD on 4/02/2025 at 12:44 PM     Finalized by (CST): Martínez Escobedo MD on 4/02/2025 at 12:49 PM            Assessment/Plan:  Principal Problem:    Acute subdural hematoma (HCC)  Active Problems:    Chronic subdural hematoma (HCC)    Subdural hemorrhage following injury, no loss of consciousness (HCC)    Unspecified fracture of second lumbar vertebra, initial encounter for closed fracture (HCC)    Essential hypertension    Unspecified fall, initial encounter    Long-term use of aspirin therapy        Arely Yadav is a(n) 89 year old female with bilateral subacute appearing frontal SDH while taking aspirin.  Repeat CT head this morning is stable.  No acute neurosurgical intervention at this time  Hold aspirin for 2 weeks.  No use of anti-inflammatories.  Patient will need to follow-up in the neurosurgical office with a repeat CT head in 2 weeks for surveillance of blood products  Medical management per the hospitalist team     All questions and concerns were addressed. We appreciate the opportunity to participate in the care of this patient. Please do not hesitate to call our office ([phone]) with any issues.     Troy Lopes PA-C  4/3/2025  10:16 AM                      MEDICATIONS ADMINISTERED IN LAST 1 DAY:  melatonin cap/tab 5 mg       Date Action Dose Route User    4/2/2025 2042 Given 5 mg Oral Kanwal Angeles RN          metoprolol tartrate (Lopressor) tab 25 mg       Date Action Dose Route User    4/2/2025 1437 Given 25 mg Oral Albert Marquez RN          morphINE PF 2 MG/ML injection 2 mg       Date Action  Dose Route User    4/2/2025 1200 Given 2 mg Intravenous Albert Marquez RN          QUEtiapine (SEROquel) tab 25 mg       Date Action Dose Route User    4/2/2025 2042 Given 25 mg Oral Kanwal Angeles RN          sodium chloride 0.9% infusion       Date Action Dose Route User    4/2/2025 1741 New Bag (none) Intravenous Corinne Sharma RN            Vitals (last day)       Date/Time Temp Pulse Resp BP SpO2 Weight O2 Device O2 Flow Rate (L/min) Who    04/03/25 0822 98.2 °F (36.8 °C) 72 16 127/50 98 % -- None (Room air) -- DS    04/03/25 0500 98.5 °F (36.9 °C) 69 14 108/47 97 % -- None (Room air) -- RB    04/02/25 2035 98.4 °F (36.9 °C) 64 16 117/67 98 % -- None (Room air) -- RB    04/02/25 1819 -- 65 -- -- -- -- -- -- RW    04/02/25 1708 98.5 °F (36.9 °C) 63 18 140/67 99 % 112 lb 7 oz (51 kg) None (Room air) -- MK    04/02/25 1630 -- 55 20 109/57 95 % -- None (Room air) -- SD    04/02/25 1408 -- 92 20 141/70 99 % -- None (Room air) -- SD    04/02/25 1032 98.2 °F (36.8 °C) 90 18 132/71 98 % -- None (Room air) -- SW

## 2025-04-04 VITALS
WEIGHT: 112.44 LBS | BODY MASS INDEX: 21 KG/M2 | SYSTOLIC BLOOD PRESSURE: 133 MMHG | RESPIRATION RATE: 18 BRPM | OXYGEN SATURATION: 100 % | DIASTOLIC BLOOD PRESSURE: 55 MMHG | HEART RATE: 74 BPM | TEMPERATURE: 98 F

## 2025-04-04 PROCEDURE — 99232 SBSQ HOSP IP/OBS MODERATE 35: CPT | Performed by: REGISTERED NURSE

## 2025-04-04 RX ORDER — CEPHALEXIN 500 MG/1
500 CAPSULE ORAL 2 TIMES DAILY
Status: DISCONTINUED | OUTPATIENT
Start: 2025-04-04 | End: 2025-04-04

## 2025-04-04 RX ORDER — CEPHALEXIN 500 MG/1
500 CAPSULE ORAL 2 TIMES DAILY
Qty: 10 CAPSULE | Refills: 0 | Status: SHIPPED | OUTPATIENT
Start: 2025-04-04 | End: 2025-04-09

## 2025-04-04 NOTE — DISCHARGE INSTRUCTIONS
Home Health   [facility]  Phone: [phone]  Fax: [phone]    [facility]  Phone: [phone]  Fax: [phone]

## 2025-04-04 NOTE — CM/SW NOTE
Sw sent out [facility] HH referrals via aidin.  SW placed f2f. SW will provide daughter HH list/choice when avail.     Sw sent out [facility] referrals via aidin, [facility] order attached. SW will provided [facility] list when avail.     114pm- SW was informed that [facility] is able to accept patient.  [facility] reserved via aidin, they are aware of patient discharging today.     116pm- SW was informed that [facility] is able to accept patient. Sw informed [facility] that patient is discharging today, and to reach out to daughter as main point of contact.     159pm- SW provided daughter information for [facility] referral line and [facility]. SW advised daughter to look at patient's insurance to see which [facility] physician is able to accommodate  patient's insurance     DC to Home with [facility], and [facility]     SW/CM to remain available for support and/or discharge planning.     Amy Hoffman, MSW, LSW   x [phone]

## 2025-04-04 NOTE — PALLIATIVE CARE NOTE
[facility]  part of [facility]  Palliative Care Progress Note    Arely Yadav Patient Status:  Inpatient    10/27/1935 MRN [id number]   Location [facility] 5SW/SE Attending Trevor Gonzalez, DO   Hosp Day # 2 PCP WENDI SUE MD     The  Cures Act makes medical notes like these available to patients in the interest of transparency. Please be advised this is a medical document. Medical documents are intended to carry relevant information, facts as evident, and the clinical opinion of the practitioner. The medical note is intended as peer to peer communication and may appear blunt or direct. It is written in medical language and may contain abbreviations or verbiage that are unfamiliar.       Subjective     Reviewed symptom medications past 24 hrs: Tylenol 500 mg po X1    Patient was seen and examined in bed. Pt is alert and confused. She is calm and not agitated this morning. No signs of pain or respiratory issues on RA. Appetite is poor per RN and says she ate a small amount of breakfast today. Moved her bowels 3 days ago.    See summary of discussion below.     Review of Systems:  Unable to obtain ROS due to pt's factor above    Allergies:  Allergies[1]    Medications:     Current Facility-Administered Medications:     cephALEXin (Keflex) cap 500 mg, 500 mg, Oral, BID    acetaminophen (Tylenol Extra Strength) tab 500 mg, 500 mg, Oral, Q4H PRN    ondansetron (Zofran) 4 MG/2ML injection 4 mg, 4 mg, Intravenous, Q6H PRN    QUEtiapine (SEROquel) tab 25 mg, 25 mg, Oral, Nightly PRN    melatonin cap/tab 5 mg, 5 mg, Oral, Nightly    Objective     Vital Signs:  Blood pressure (!) 162/74, pulse 78, temperature 98.2 °F (36.8 °C), temperature source Axillary, resp. rate 18, weight 112 lb 7 oz (51 kg), SpO2 99%.  Body mass index is 20.56 kg/m².  Present Level of pain: MARY ANN  Non-verbal signs of pain present: No    Physical Exam:  General: Alert and in no apparent distress. Weak,  frail/thin appearing  HEENT: No focal deficits.  Cardiac: Regular rate and rhythm, S1, S2 normal, no murmur, rub or gallop.  Breast: L firm, fixed breast nodule noted  Lungs: Clear without wheezes, rales, rhonchi or dullness.  Normal excursions and effort.  Abdomen: Soft, non-tender, normal bowel sounds X 4 quadrants, no rebound or guarding  Extremities: Without clubbing, cyanosis. Peripheral pulses are 2+. BLE Edema not present  Neurologic: Alert and minimally verbal, calm   Skin: Warm and dry.    Prior to admission Palliative performance scale PPSv2 (%): 40    Current PPS 40%    Hematology:  Lab Results   Component Value Date    WBC 13.2 (H) 04/02/2025    HGB 11.7 (L) 04/02/2025    HCT 34.4 (L) 04/02/2025    .0 04/02/2025       Coags:No results found for: \"PT\", \"INR\", \"PTT\"    Chemistry:  Lab Results   Component Value Date    CREATSERUM 1.01 04/02/2025    BUN 14 04/02/2025     04/02/2025    K 3.8 04/03/2025     04/02/2025    CO2 29.0 04/02/2025     (H) 04/02/2025    CA 9.6 04/02/2025    ALB 3.8 03/26/2025    ALKPHO 62 03/26/2025    BILT 0.2 03/26/2025    TP 6.5 03/26/2025    AST 18 03/26/2025    ALT <7 (L) 03/26/2025       Imaging:  CT BRAIN OR HEAD (CPT=70450)    Result Date: 4/3/2025  CONCLUSION:   Chronic appearing bilateral subdural hemorrhages are unchanged since 4/2/2025.  No acute hemorrhage or midline shift.  Bilateral parenchymal calcifications are nonspecific but can be seen with remote inflammation or remote infection such as neurocysticercosis which is unchanged.  Mild chronic microvascular ischemic disease.     Dictated by (CST): Martínez Escobedo MD on 4/03/2025 at 8:37 AM     Finalized by (CST): Martínez Escobedo MD on 4/03/2025 at 8:43 AM          CT SPINE CERVICAL (CPT=72125)    Result Date: 4/2/2025  CONCLUSION:   1. No acute fracture or traumatic listhesis of the cervical spine. 2. Multilevel degenerative changes of the cervical spine, which are described above. 3. Reversal the  cervical lordosis.  4. Mild dextrocurvature of the cervical spine.    Dictated by (CST): Rashid Hernandez MD on 4/02/2025 at 2:43 PM     Finalized by (CST): Rashid Hernandez MD on 4/02/2025 at 2:49 PM          CT SPINE LUMBAR (CPT=72131)    Result Date: 4/2/2025  CONCLUSION:   1. Small cortical irregularity involving the right transverse process of L2, which is concerning for an age indeterminate nondisplaced fracture. 2. Chronic cortical irregularity involving the right transverse process of L1, which may be secondary to congenital nonunion or sequela of prior trauma. 3. Lumbar vertebral body heights are maintained 4. Mild widening of the interspinous space at L2-L3, which may be secondary to ligamentous injury and/or interspinous bursitis.  5. Multilevel degenerative changes of the lumbar spine, which are described in detail above. 6. At L3-L4, there is severe canal stenosis, severe right greater than left subarticular zone stenosis, and moderate bilateral foraminal narrowing. 7. At L5-S1, there is mild canal stenosis, severe right foraminal narrowing, and moderate to severe left foraminal narrowing. 8. Mild dextrocurvature of the lumbar spine. 9. Mild subcutaneous edema overlying the spinous processes at L1-L3, which may be posttraumatic. 10. Prominence of the common bile duct measuring up to 10 mm, which is favored to be postoperative in the setting of cholecystectomy as well as age-related but consider correlation with liver function tests.  11. Lesser incidental findings described above.     Dictated by (CST): Rashid Hernandez MD on 4/02/2025 at 12:40 PM     Finalized by (CST): Rashid Hernandez MD on 4/02/2025 at 12:56 PM          CT BRAIN OR HEAD (CPT=70450)    Result Date: 4/2/2025  CONCLUSION:  Subacute to remote appearing subdural hemorrhages along the bilateral superior frontal lobes.  Mild mass effect upon the bilateral frontal lobes.  No midline shift.  Mild chronic microvascular ischemic disease.  This  report was communicated by telephone to Dr. Tim on 4/2/2025 at 1249 hours.     Dictated by (CST): Martínez Escobedo MD on 4/02/2025 at 12:44 PM     Finalized by (CST): Martínez Escobedo MD on 4/02/2025 at 12:49 PM           Follow up GOC Discussion      4/4/25-I followed back with pt's dtr Arely and provided clinical update. She was very upset that the food she brought for pt was not given to her today and I apologizes and alerted staff about concern. I inquired how hospice meeting went yesterday and she tells me for now she does not want to transition pt to hospice until she gets a work up on the L breast mass. I discussed she will need to do this as OP per MD and she says this information will help her with making decision for hospice care for the future. We discussed plan for return home with HH and agreeable to community palliative care following. I discussed she will continue to be at risk for recurrent hospitalizations and she knows her dementia will continue to progress. I encouraged keeping pt's comfort and QOL a priority.    I also discussed the importance of POLST form completion prior to dc so wishes are respected across the healthcare continuum. I confirmed wishes for DNAR/DNI-selective, but Arely declined to complete POLST today. She says she will complete this as OP once she makes decisions after OP breast mass work up. She is ok with keeping the DNAR/selective in EPIC.  Provided emotional support to pt's dtr who is coping adequately.    Discussed with Patient's dtr: yes  Patient's preference about sharing medical information: speak to pt's dtr Arely  Patient's decision making preferences: speak to pt's dtr Arely  Code status: DNAR/DNI-selective  Have advanced directives been discussed with patient or healthcare power of : Yes        Healthcare Agent Appointed: Yes  Healthcare Agent's Name: Arely Escobar  Healthcare Agent's Phone Number: [phone]          Spiritual needs  addressed: Patient/family declined Spiritual Care    Palliative disposition: Ongoing discussions    Procedures:  No intubation  No g-tube    Palliative Care Assessment and Recommendations     Problem List:   SDH  Falls  Advanced dementia with behavior disturbance  L breast mass concerning for malignancy  Leukocytosis  Weakness  HTN  H/o CVA  Glaucoma  Malnutrition     Goals of care counseling  -see above for details  -Confirmed wishes for DNAR/DNI-selective and continue supportive care.  -Discussed concern with incidental finding of L breast mass concerning for malignancy and dtr would like to pursue OP w/u, but would not pursue any cancer tx. She wants this information to help her with decision about hospice transition.   -[facility] to provided informational session 4/3, but dtr is NOT ready for hospice right now.  -Ongoing GOC discussions will be needed over time.   -Agreeable to palliative care following  -Dispo:  Prefers to return home on dc with HH/CPC. Encouraged consideration for CG support. SW to help with dc planning.   -Provided emotional support to pt/family who are coping adequately.     Advance care planning  -see above for details  -[phone]  -Provided [place] of [place] for review and discussed importance of completion prior to dc, but dtr is declining to complete today. She is ok keeping the DNAR/DNI-selective in EPIC.     Palliative Performance Scale 40%     Emotion support provided to patient/family today: Yes    A total of 35 mins were spent on this consult, which included all of the following: direct face to face contact, history taking, physical examination, and >50% was spent counseling and coordinating care.    Discussed today's visit with Amy JARA, [facility], Dr. Gonzalez and Lexi SADLER    I will sign off as dc is anticipated.    Shruti Mazariegos, ANP-BC, ACHPN [id number]  4/4/2025  11:39  AM  Palliative Care Services           [1] No Known Allergies

## 2025-04-04 NOTE — PLAN OF CARE
Pt adequate for discharge. Will be transported home via son and daughter. All prescribed paperwork and instructions provided. Verbal understanding given. No further questions at this time

## 2025-04-04 NOTE — HOSPICE RN NOTE
RH received communication from palliative APN Shruti Mazariegos that pt's daughter is not interested in pursuing hospice at this time and is wanting to pursue work up for breast mass and is agreeable to HH/CPC following hospitalization. RH will sign off for now, please reach out if there are any changes/questions.    Suzanna Tan, RN, BSN  [facility]  Transitional Nurse Liaison  [phone] or After hours: [phone]

## 2025-04-04 NOTE — PLAN OF CARE
Problem: Patient Centered Care  Goal: Patient preferences are identified and integrated in the patient's plan of care  Description: Interventions:- What would you like us to know as we care for you? From home with daughter   - Provide timely, complete, and accurate information to patient/family- Incorporate patient and family knowledge, values, beliefs, and cultural backgrounds into the planning and delivery of care- Encourage patient/family to participate in care and decision-making at the level they choose- Honor patient and family perspectives and choices  Outcome: Progressing     Problem: Patient/Family Goals  Goal: Patient/Family Long Term Goal  Description: Patient's Long Term Goal:   Interventions:-   - See additional Care Plan goals for specific interventions  Outcome: Progressing  Goal: Patient/Family Short Term Goal  Description: Patient's Short Term Goal:   Interventions: -   - See additional Care Plan goals for specific interventions  Outcome: Progressing

## 2025-04-04 NOTE — DISCHARGE SUMMARY
General Medicine Discharge Summary     Patient ID:  Arely Yadav  89 year old  10/27/1935    Admit date: 4/2/2025    Discharge date and time: 4/4/25    Attending Physician: Trevor Gonzalez DO     Primary Care Physician: WENDI SUE MD     Discharge Diagnoses:   Falls  Generalized weakness and deconditioning most likely, progressive dementia   Bilateral frontal lobe subdural hemorrhage, suspect subacute to chronic, no midline shift  Severe dementia, likely Alzheimers   Left breast mass     Discharge Condition: stable    Disposition: home with home care services    Important Follow up:  Troy Lopes PA-C  [address]   [address]  [facility] [id number] [phone]    Follow up in 2 week(s)  For reevaluation of subdural hemorrhage.  Patient should remain off of aspirin until follow-up appointment.    Wendi Sue MD  [address]  [facility] [address] [phone]    Schedule an appointment as soon as possible for a visit      Hospital Course:    This is a 89-year-old female with a history of dementia, history of stroke in the past, hypertension, who presents to the hospital for falls, weakness, left arm pain.      Falls  Generalized weakness and deconditioning most likely, progressive dementia   Bilateral frontal lobe subdural hemorrhage, suspect subacute to chronic, no midline shift  -suspect brain findings are not acute  -neurologically her exam is intact and stable   -repeat CT head stable  -hold nsaids or blood thinners      Severe dementia, likely Alzheimers   -She currently does not take any medications  -Suspect slow and progressive decline in her functional status, memory  -I did discuss at length with the son and daughter about the normal progression of dementia  -I do believe they will benefit from palliative care consult to further educate on resources available, options if symptoms occur, and eventual transition to hospice  when the time comes.    -appreciate palliative consult   -hospice consult for educational meeting---at this time declining hospice   -Brought up the idea about eventually her having worsening nutrition, the idea of G-tube feeding, versus pleasure feeding, or both so they are aware of the natural progression of dementia.   -referral for community palliative care   -code status discussed and patient is DNR     Left breast mass  -outpatient work up if desired by patient/family.     Positive UA  Possible UTI, unable to describe symptoms, culture with pan-sensitive E coli, started keflex 500mg BID for 5 days    Consults:     IP CONSULT TO NEUROSURGERY  IP CONSULT PALLIATIVE CARE  NURSING CONSULT TO DIETITIAN  IP CONSULT TO SOCIAL WORK      Patient instructions:        Current Discharge Medication List        START taking these medications    Details   cephALEXin 500 MG Oral Cap Take 1 capsule (500 mg total) by mouth 2 (two) times daily for 5 days.           CONTINUE these medications which have NOT CHANGED    Details   latanoprost 0.005 % Ophthalmic Solution Place 1 drop into both eyes nightly.           Code Status: DNAR/Selective Treatment    Exam on day of discharge:     Vitals:    04/04/25 0854   BP: (!) 162/74   Pulse: 78   Resp: 18   Temp: 98.2 °F (36.8 °C)       General: no acute distress  Heart: RRR  Lungs: clear bilaterally  Abdomen: nontender  Extremities: no pedal edema     Total time coordinating care for discharge: Greater than 30 minutes    Trevor Gonzalez DO

## 2025-04-08 ENCOUNTER — TELEPHONE (OUTPATIENT)
Dept: FAMILY MEDICINE CLINIC | Facility: CLINIC | Age: 89
End: 2025-04-08

## 2025-04-08 NOTE — TELEPHONE ENCOUNTER
Sent signed physician order #[id number] to [facility] fax# [phone]. Confirmation rec'd.

## 2025-04-09 NOTE — PAYOR COMM NOTE
--------------  DISCHARGE REVIEW    Payor: [employer]  Subscriber #:  [id number]  Authorization Number: [id number]    Admit date: 4/2/25  Admit time:   5:07 PM  Discharge Date: 4/4/2025  2:59 PM     Admitting Physician: Trevor Gonzalez DO  Attending Physician:  No att. providers found  Primary Care Physician: Mirian Sue MD          Discharge Summary Notes        Discharge Summary signed by Trevor Gonzalez DO at 4/4/2025  5:29 PM       Author: Trevor Gonzalez DO Specialty: HOSPITALIST Author Type: Physician    Filed: 4/4/2025  5:29 PM Date of Service: 4/4/2025  1:16 PM Status: Signed    : Trevor Gonzalez DO (Physician)                                                              General Medicine Discharge Summary     Patient ID:  Arely Yadav  89 year old  10/27/1935    Admit date: 4/2/2025    Discharge date and time: 4/4/25    Attending Physician: Trevor Gonzalez DO     Primary Care Physician: MIRIAN SUE MD     Discharge Diagnoses:   Falls  Generalized weakness and deconditioning most likely, progressive dementia   Bilateral frontal lobe subdural hemorrhage, suspect subacute to chronic, no midline shift  Severe dementia, likely Alzheimers   Left breast mass     Discharge Condition: stable    Disposition: home with home care services    Important Follow up:  Troy Lopes PA-C  [address] [facility] [address]  [facility] [id number] [phone]    Follow up in 2 week(s)  For reevaluation of subdural hemorrhage.  Patient should remain off of aspirin until follow-up appointment.    Mirian Sue MD  [address]  [facility] [address] [phone]    Schedule an appointment as soon as possible for a visit      Hospital Course:    This is a 89-year-old female with a history of dementia, history of stroke in the past, hypertension, who presents to the hospital for falls, weakness, left arm pain.      Falls  Generalized weakness and deconditioning most likely, progressive dementia   Bilateral  frontal lobe subdural hemorrhage, suspect subacute to chronic, no midline shift  -suspect brain findings are not acute  -neurologically her exam is intact and stable   -repeat CT head stable  -hold nsaids or blood thinners      Severe dementia, likely Alzheimers   -She currently does not take any medications  -Suspect slow and progressive decline in her functional status, memory  -I did discuss at length with the son and daughter about the normal progression of dementia  -I do believe they will benefit from palliative care consult to further educate on resources available, options if symptoms occur, and eventual transition to hospice when the time comes.    -appreciate palliative consult   -hospice consult for educational meeting---at this time declining hospice   -Brought up the idea about eventually her having worsening nutrition, the idea of G-tube feeding, versus pleasure feeding, or both so they are aware of the natural progression of dementia.   -referral for community palliative care   -code status discussed and patient is DNR     Left breast mass  -outpatient work up if desired by patient/family.     Positive UA  Possible UTI, unable to describe symptoms, culture with pan-sensitive E coli, started keflex 500mg BID for 5 days    Consults:     IP CONSULT TO NEUROSURGERY  IP CONSULT PALLIATIVE CARE  NURSING CONSULT TO DIETITIAN  IP CONSULT TO SOCIAL WORK      Patient instructions:        Current Discharge Medication List        START taking these medications    Details   cephALEXin 500 MG Oral Cap Take 1 capsule (500 mg total) by mouth 2 (two) times daily for 5 days.           CONTINUE these medications which have NOT CHANGED    Details   latanoprost 0.005 % Ophthalmic Solution Place 1 drop into both eyes nightly.           Code Status: DNAR/Selective Treatment    Exam on day of discharge:     Vitals:    04/04/25 0854   BP: (!) 162/74   Pulse: 78   Resp: 18   Temp: 98.2 °F (36.8 °C)       General: no acute  distress  Heart: RRR  Lungs: clear bilaterally  Abdomen: nontender  Extremities: no pedal edema     Total time coordinating care for discharge: Greater than 30 minutes    Trevor Gonzalez DO      Electronically signed by Trevor Gonzalez DO on 4/4/2025  5:29 PM         REVIEWER COMMENTS

## 2025-04-17 NOTE — PROGRESS NOTES
Physician Clarification    Additional information related to the patient's condition    Likely traumatic subdural hematoma in the setting of patient having a remote history of multiple falls while taking Aspirin.     This note is part of the patient's medical record.

## 2025-04-18 ENCOUNTER — HOSPITAL ENCOUNTER (OUTPATIENT)
Dept: CT IMAGING | Facility: HOSPITAL | Age: 89
Discharge: HOME | End: 2025-04-18
Payer: MEDICARE

## 2025-04-18 ENCOUNTER — OFFICE VISIT (OUTPATIENT)
Dept: SURGERY | Facility: CLINIC | Age: 89
End: 2025-04-18
Payer: MEDICARE

## 2025-04-18 VITALS — HEART RATE: 86 BPM | OXYGEN SATURATION: 100 % | DIASTOLIC BLOOD PRESSURE: 58 MMHG | SYSTOLIC BLOOD PRESSURE: 97 MMHG

## 2025-04-18 DIAGNOSIS — S06.5XAA ACUTE SUBDURAL HEMATOMA (HCC): ICD-10-CM

## 2025-04-18 DIAGNOSIS — I62.03 CHRONIC SUBDURAL HEMATOMA (HCC): ICD-10-CM

## 2025-04-18 DIAGNOSIS — I62.03 CHRONIC SUBDURAL HEMATOMA (HCC): Primary | ICD-10-CM

## 2025-04-18 PROCEDURE — 70450 CT HEAD/BRAIN W/O DYE: CPT

## 2025-04-18 NOTE — PROGRESS NOTES
[facility]        [place] [facility]      [address]  [address]  [place] [address]    PHONE  [phone]          FAX  [phone]    [email or web address]      OFFICE FOLLOW-UP NOTE            Arely Yadav    : 10/27/1935    MRN: [id number]  CSN: [id number]      PCP: WENDI SUE MD  Referring Provider: No ref. provider found    Insurance: Payor: [facility] / Plan: [employer] HMO / Product Type: Medicare /           Date of Visit: 2025    Reason for Visit:   Chief Complaint    Hospital F/U                         History of Present Care:  Arely Yadav is a a(n) 89 year old, female returns to my office 2 weeks following hospital admission with acute on chronic bilateral subdural hematomas.  Patient has remained off of aspirin since admission.  She repeated CT of the head today.  Patient has been endorsing frontal headache, insomnia and has been less willing to work with therapy.  Patient denies lethargy, visual disturbance.      History:  .  Past Medical History[1]   Past Surgical History[2]   Family History[3]   Social History     Socioeconomic History    Marital status:      Spouse name: Not on file    Number of children: Not on file    Years of education: Not on file    Highest education level: Not on file   Occupational History    Not on file   Tobacco Use    Smoking status: Never    Smokeless tobacco: Never   Vaping Use    Vaping status: Never Used   Substance and Sexual Activity    Alcohol use: Never    Drug use: Never    Sexual activity: Not on file   Other Topics Concern    Not on file   Social History Narrative    Not on file     Social Drivers of Health     Food Insecurity: No Food Insecurity (2025)    NCSS - Food Insecurity     Worried About Running Out of Food in the Last Year: No     Ran Out of Food in the Last Year: No   Transportation Needs: No Transportation Needs (2025)    NCSS -  Transportation     Lack of Transportation: No   Stress: Not on file   Housing Stability: Not At Risk (4/2/2025)    NCSS - Housing/Utilities     Has Housing: Yes     Worried About Losing Housing: No     Unable to Get Utilities: No        Allergies:  Allergies[4]      Medications:  Current Medications[5]     Review of Systems:  A 10-point system was reviewed.  Pertinent positives and negatives are noted in HPI.      Physical Exam:  BP 97/58   Pulse 86   SpO2 100%         Neurological Exam:  Alert, following commands  PERRLA  EOMI  Face symmetrical  Tongue midline  Hearing symmetrical and intact to finger rub    No pronator drift    Romberg negative      Abdomen:  Soft, non-distended, non-tender, with no rebound or guarding.  No peritoneal signs.     Extremities:  Non-tender, no lower extremity edema noted.      Labs:  CBC:  Lab Results   Component Value Date    WBC 13.2 (H) 04/02/2025    HGB 11.7 (L) 04/02/2025    HCT 34.4 (L) 04/02/2025    MCV 99.1 04/02/2025    .0 04/02/2025      BMG:  Lab Results   Component Value Date     04/02/2025    K 3.8 04/03/2025    CO2 29.0 04/02/2025     04/02/2025    BUN 14 04/02/2025      INR:  No results found for: \"INR\", \"PROTIME\"       Diagnostics:  CT head dated 4/18/25 reviewed:  Acute on chronic bilateral frontal subdural hematomas have minimally increase in size and density since 4/3/2025.  More acute appearing hemorrhage seen along the right superior frontal lobe.    Diagnosis:  1. Chronic subdural hematoma (HCC)    2. Acute subdural hematoma (HCC)      Assessment/Plan:  Arely Yadav returns to the office for review of CT head which she completed today.  There has been mild increase in size and density since previous exam 2 weeks ago.  Patient has remained off of aspirin at this time until follow-up today.  Despite minimally increase in size, the acute on chronic appearance remains similar.  We will plan to keep patient off of aspirin and repeat an  additional CT head in 4 weeks and she will follow-up at that time for review.      More than 30 minutes were spent during this visit and the coordination of this patient's care. All questions and concerns were addressed. We appreciate the opportunity to participate in the care of this patient. Please do not hesitate to call our office ([phone]) with any issues.    This note has been dictated utilizing voice recognition software. Unfortunately, this may lead to occasional typographical errors. If there are any questions regarding this, please do not hesitate to contact our office.           Troy Lopes PA-C    4/18/2025  11:06 AM       [1]   Past Medical History:   Dementia (HCC)    Essential hypertension    Glaucoma    Stroke (HCC)   [2] No past surgical history on file.  [3]   Family History  Problem Relation Age of Onset    Uterine Cancer Sister     Breast Cancer 2nd occurrence Sister     Pancreatic Cancer Other     Other (cirrhosis) Other    [4] No Known Allergies  [5]   Current Outpatient Medications   Medication Sig Dispense Refill    latanoprost 0.005 % Ophthalmic Solution Place 1 drop into both eyes nightly.

## 2025-04-18 NOTE — PROGRESS NOTES
Head pain/ frontal  No sleeping  Dementia - she does not see neurologist or she is not on dementia medication  Throwing up nauseous  Vision is okay  Light headed  She is weak

## (undated) NOTE — LETTER
AUTHORIZATION FOR SURGICAL OPERATION OR OTHER PROCEDURE    1. I hereby authorize Home Wolf /Celina Santiago PA-C, and [facility] staff assigned to my case to perform the following operation and/or procedure at the [facility] site:    _______________________________________________________________________________________________    left knee cortisone injection  _______________________________________________________________________________________________    2.  My physician has explained the nature and purpose of the operation or other procedure, possible alternative methods of treatment, the risks involved, and the possibility of complication to me.  I acknowledge that no guarantee has been made as to the result that may be obtained.  3.  I recognize that, during the course of this operation, or other procedure, unforseen conditions may necessitate additional or different procedure than those listed above.  I, therefore, further authorize and request that the above named physician, his/her physician assistants or designees perform such procedures as are, in his/her professional opinion, necessary and desirable.  4.  Any tissue or organs removed in the operation or other procedure may be disposed of by and at the discretion of the [facility] and [facility].  5.  I understand that in the event of a medical emergency, I will be transported by local paramedics to [facility] or other hospital emergency department.  6.  I certify that I have read and fully understand the above consent to operation and/or other procedure.    7.  I acknowledge that my physician has explained sedation/analgesia administration to me including the risks and benefits.  I consent to the administration of sedation/analgesia as may be necessary or desirable in the judgement of my physician.    Witness signature: ___________________________________________________ Date:   ______/______/_____                    Time:  ________ A.M.  P.M.       Patient Name:  ______________________________________________________  (please print)      Patient signature:  ___________________________________________________             Relationship to Patient:           []  Parent    Responsible person                          []  Spouse  In case of minor or                    [] Other  _____________   Incompetent name:  __________________________________________________                               (please print)      _____________      Responsible person  In case of minor or  Incompetent signature:  _______________________________________________    Statement of Physician  My signature below affirms that prior to the time of the procedure, I have explained to the patient and/or his/her guardian, the risks and benefits involved in the proposed treatment and any reasonable alternative to the proposed treatment.  I have also explained the risks and benefits involved in the refusal of the proposed treatment and have answered the patient's questions.                        Date:  ______/______/_______  Provider                      Signature:  __________________________________________________________       Time:  ___________ A.M    P.M.